# Patient Record
Sex: MALE | Employment: OTHER | URBAN - METROPOLITAN AREA
[De-identification: names, ages, dates, MRNs, and addresses within clinical notes are randomized per-mention and may not be internally consistent; named-entity substitution may affect disease eponyms.]

---

## 2020-03-09 ENCOUNTER — OFFICE VISIT (OUTPATIENT)
Dept: FAMILY MEDICINE CLINIC | Facility: CLINIC | Age: 85
End: 2020-03-09
Payer: MEDICARE

## 2020-03-09 VITALS
BODY MASS INDEX: 23.19 KG/M2 | DIASTOLIC BLOOD PRESSURE: 62 MMHG | SYSTOLIC BLOOD PRESSURE: 138 MMHG | HEIGHT: 70 IN | TEMPERATURE: 98 F | RESPIRATION RATE: 16 BRPM | HEART RATE: 93 BPM | WEIGHT: 162 LBS

## 2020-03-09 DIAGNOSIS — N52.9 ERECTILE DYSFUNCTION, UNSPECIFIED ERECTILE DYSFUNCTION TYPE: ICD-10-CM

## 2020-03-09 DIAGNOSIS — I10 ESSENTIAL HYPERTENSION: ICD-10-CM

## 2020-03-09 DIAGNOSIS — N40.0 BENIGN PROSTATIC HYPERPLASIA WITHOUT LOWER URINARY TRACT SYMPTOMS: ICD-10-CM

## 2020-03-09 DIAGNOSIS — H40.9 GLAUCOMA OF BOTH EYES, UNSPECIFIED GLAUCOMA TYPE: Primary | ICD-10-CM

## 2020-03-09 DIAGNOSIS — Z76.89 ESTABLISHING CARE WITH NEW DOCTOR, ENCOUNTER FOR: ICD-10-CM

## 2020-03-09 DIAGNOSIS — E78.5 HYPERLIPIDEMIA, UNSPECIFIED HYPERLIPIDEMIA TYPE: ICD-10-CM

## 2020-03-09 PROCEDURE — 99204 OFFICE O/P NEW MOD 45 MIN: CPT | Performed by: FAMILY MEDICINE

## 2020-03-09 RX ORDER — LATANOPROST 50 UG/ML
SOLUTION/ DROPS OPHTHALMIC NIGHTLY
COMMUNITY
End: 2020-03-09

## 2020-03-09 RX ORDER — DUTASTERIDE 0.5 MG/1
0.5 CAPSULE, LIQUID FILLED ORAL DAILY
Qty: 90 CAPSULE | Refills: 0 | Status: SHIPPED | OUTPATIENT
Start: 2020-03-09 | End: 2021-02-04

## 2020-03-09 RX ORDER — PRAVASTATIN SODIUM 40 MG
40 TABLET ORAL NIGHTLY
COMMUNITY
End: 2020-03-09

## 2020-03-09 RX ORDER — TIZANIDINE 2 MG/1
2 TABLET ORAL 3 TIMES DAILY
COMMUNITY

## 2020-03-09 RX ORDER — PRAVASTATIN SODIUM 40 MG
40 TABLET ORAL NIGHTLY
Qty: 90 TABLET | Refills: 0 | Status: SHIPPED | OUTPATIENT
Start: 2020-03-09

## 2020-03-09 RX ORDER — SILDENAFIL 100 MG/1
100 TABLET, FILM COATED ORAL
COMMUNITY

## 2020-03-09 RX ORDER — DUTASTERIDE 0.5 MG/1
0.5 CAPSULE, LIQUID FILLED ORAL DAILY
COMMUNITY
End: 2020-03-09

## 2020-03-09 RX ORDER — AMLODIPINE BESYLATE AND BENAZEPRIL HYDROCHLORIDE 10; 20 MG/1; MG/1
1 CAPSULE ORAL DAILY
COMMUNITY
End: 2020-03-09

## 2020-03-09 RX ORDER — LATANOPROST 50 UG/ML
1 SOLUTION/ DROPS OPHTHALMIC NIGHTLY
Qty: 7.5 ML | Refills: 0 | Status: SHIPPED | OUTPATIENT
Start: 2020-03-09 | End: 2020-06-03

## 2020-03-09 RX ORDER — AMLODIPINE BESYLATE AND BENAZEPRIL HYDROCHLORIDE 10; 20 MG/1; MG/1
1 CAPSULE ORAL DAILY
Qty: 90 CAPSULE | Refills: 0 | Status: SHIPPED | OUTPATIENT
Start: 2020-03-09 | End: 2021-08-25

## 2020-03-09 NOTE — PROGRESS NOTES
HPI:    Patient ID: Nolberto Flores is a 82year old male. This is a 51-year-old -American male here today to establish care with a new physician. Patient is treated for chronic hypertension as well as hyperlipidemia.   He does have reoccurring low distress. Neurological: He is alert and oriented to person, place, and time. No cranial nerve deficit. ASSESSMENT/PLAN:   1.  Establishing care with new doctor, encounter for  Care has been established and the patient will come back for preve enabled. Return in about 3 months (around 6/9/2020), or if symptoms worsen or fail to improve.            #[id number]

## 2020-03-09 NOTE — PATIENT INSTRUCTIONS
Patient referred to ophthalmology regarding glaucoma and any other pathology of the eye. Medications have been reviewed and renewed. Compliance with taking medication is encouraged.   Patient has been advised that he can come back to have a complete physi

## 2020-03-17 PROBLEM — Z76.89 ESTABLISHING CARE WITH NEW DOCTOR, ENCOUNTER FOR: Status: ACTIVE | Noted: 2020-03-17

## 2020-06-01 ENCOUNTER — TELEPHONE (OUTPATIENT)
Dept: FAMILY MEDICINE CLINIC | Facility: CLINIC | Age: 85
End: 2020-06-01

## 2020-06-01 NOTE — TELEPHONE ENCOUNTER
pt. has resched his 6/9 appt. to 6/18/20, pt. insisted on in-office appt. , instead of Virtual appt., Is it ok with Dr Fernando Salvador for in-office, as the pt is 82years old? Virtual appt. , was offered, but the pt declined.

## 2020-06-03 DIAGNOSIS — H40.9 GLAUCOMA OF BOTH EYES, UNSPECIFIED GLAUCOMA TYPE: ICD-10-CM

## 2020-06-03 RX ORDER — LATANOPROST 50 UG/ML
SOLUTION/ DROPS OPHTHALMIC
Qty: 9 ML | Refills: 0 | Status: SHIPPED | OUTPATIENT
Start: 2020-06-03 | End: 2020-06-06

## 2020-06-06 DIAGNOSIS — H40.9 GLAUCOMA OF BOTH EYES, UNSPECIFIED GLAUCOMA TYPE: ICD-10-CM

## 2020-06-06 RX ORDER — LATANOPROST 50 UG/ML
SOLUTION/ DROPS OPHTHALMIC
Qty: 9 ML | Refills: 0 | Status: SHIPPED | OUTPATIENT
Start: 2020-06-06 | End: 2020-09-18

## 2020-06-18 ENCOUNTER — OFFICE VISIT (OUTPATIENT)
Dept: FAMILY MEDICINE CLINIC | Facility: CLINIC | Age: 85
End: 2020-06-18
Payer: MEDICARE

## 2020-06-18 VITALS
TEMPERATURE: 98 F | RESPIRATION RATE: 18 BRPM | WEIGHT: 162 LBS | DIASTOLIC BLOOD PRESSURE: 64 MMHG | HEIGHT: 70 IN | BODY MASS INDEX: 23.19 KG/M2 | SYSTOLIC BLOOD PRESSURE: 120 MMHG | HEART RATE: 105 BPM

## 2020-06-18 DIAGNOSIS — H40.9 GLAUCOMA OF BOTH EYES, UNSPECIFIED GLAUCOMA TYPE: ICD-10-CM

## 2020-06-18 DIAGNOSIS — E78.5 HYPERLIPIDEMIA, UNSPECIFIED HYPERLIPIDEMIA TYPE: ICD-10-CM

## 2020-06-18 DIAGNOSIS — I10 ESSENTIAL HYPERTENSION: Primary | ICD-10-CM

## 2020-06-18 PROCEDURE — 99214 OFFICE O/P EST MOD 30 MIN: CPT | Performed by: FAMILY MEDICINE

## 2020-06-18 RX ORDER — IBUPROFEN 800 MG/1
800 TABLET ORAL EVERY 8 HOURS PRN
COMMUNITY
Start: 2020-04-01 | End: 2020-09-21

## 2020-06-18 NOTE — PROGRESS NOTES
HPI:    Patient ID: Beth Singleton is a 82year old male. This patient is 53-year-old -American male newly established at our clinic who was treated for hypertension, hyperlipidemia and also treated by ophthalmologist for his glaucoma.   The paticorie alert and oriented to person, place, and time. No cranial nerve deficit, sensory deficit or motor deficit. ASSESSMENT/PLAN:   1. Essential hypertension  Blood pressure measures to goal by criteria when measured manually by physician.     2. Hyp

## 2020-06-18 NOTE — PATIENT INSTRUCTIONS
Medication reviewed and renewed where needed and appropriate. Comply with medications. Monitor blood pressures and record at home. Limit salt intake. Encouraged physical fitness and daily physical activity daily.   Keep appointment with ophthalmologist r

## 2020-06-25 ENCOUNTER — TELEPHONE (OUTPATIENT)
Dept: FAMILY MEDICINE CLINIC | Facility: CLINIC | Age: 85
End: 2020-06-25

## 2020-06-25 NOTE — TELEPHONE ENCOUNTER
Patient requesting for referral to Dr Juvenal Garcia to be faxed to his office at [phone].  Has appointment today at 11am

## 2020-07-08 ENCOUNTER — TELEPHONE (OUTPATIENT)
Dept: CASE MANAGEMENT | Age: 85
End: 2020-07-08

## 2020-07-08 NOTE — TELEPHONE ENCOUNTER
Sudeep Miner,    I am working on the referral you submitted for [facility] to see Dr. Dave Wu, ophthalmologist.    I am unable to submit this referral because you are not the PCP listed on the patient insurance card. Patient will have to call his insurance company if he wishes to change his PCP to you. Please advise patient referral is void.     Thank you  Germán Velarde

## 2020-09-18 DIAGNOSIS — H40.9 GLAUCOMA OF BOTH EYES, UNSPECIFIED GLAUCOMA TYPE: ICD-10-CM

## 2020-09-18 RX ORDER — LATANOPROST 50 UG/ML
SOLUTION/ DROPS OPHTHALMIC
Qty: 9 ML | Refills: 0 | Status: SHIPPED | OUTPATIENT
Start: 2020-09-18

## 2020-09-21 RX ORDER — IBUPROFEN 800 MG/1
800 TABLET ORAL EVERY 8 HOURS PRN
Qty: 90 TABLET | Refills: 0 | Status: SHIPPED | OUTPATIENT
Start: 2020-09-21 | End: 2021-09-20

## 2020-10-07 ENCOUNTER — TELEPHONE (OUTPATIENT)
Dept: FAMILY MEDICINE CLINIC | Facility: CLINIC | Age: 85
End: 2020-10-07

## 2020-10-07 DIAGNOSIS — Z01.00 EYE EXAM, ROUTINE: Primary | ICD-10-CM

## 2020-10-07 NOTE — TELEPHONE ENCOUNTER
Patient needs a referral for Dr. Louise Puente Ophthalmology for yearly eye exam. Please call patient when referral is ready

## 2020-10-07 NOTE — TELEPHONE ENCOUNTER
Dr. Zimmerman Began, patient is requesting a referral to Dr. Erminio Closs. Referral has been pended, please advise.

## 2020-10-08 NOTE — TELEPHONE ENCOUNTER
Spoke with pt to advise of referral. Pt states he has a appointment with Dr. Kristian Mora at [place] [address]. Another referral has been pended for approval.

## 2021-02-04 ENCOUNTER — NURSE TRIAGE (OUTPATIENT)
Dept: FAMILY MEDICINE CLINIC | Facility: CLINIC | Age: 86
End: 2021-02-04

## 2021-02-04 DIAGNOSIS — N40.0 BENIGN PROSTATIC HYPERPLASIA WITHOUT LOWER URINARY TRACT SYMPTOMS: ICD-10-CM

## 2021-02-04 RX ORDER — DUTASTERIDE 0.5 MG/1
0.5 CAPSULE, LIQUID FILLED ORAL DAILY
Qty: 90 CAPSULE | Refills: 0 | Status: SHIPPED | OUTPATIENT
Start: 2021-02-04

## 2021-02-04 NOTE — TELEPHONE ENCOUNTER
Pt states he was taking Avodart previously for increased urination. He is requesting a refill. States it was previously prescribed by Dr Jerome Win. Pt denies hematuria, dysuria, fever, abdominal pain/flank pain. Pt states only has urinary frequency.  Last offi

## 2021-03-02 ENCOUNTER — TELEPHONE (OUTPATIENT)
Dept: FAMILY MEDICINE CLINIC | Facility: CLINIC | Age: 86
End: 2021-03-02

## 2021-03-02 DIAGNOSIS — Z01.00 EYE EXAM, ROUTINE: Primary | ICD-10-CM

## 2021-03-02 NOTE — TELEPHONE ENCOUNTER
Patient is calling and asking to get a referral to see Dr. Andreas Maradiaga in Ophthalmology. Please Advise.

## 2021-03-05 ENCOUNTER — TELEPHONE (OUTPATIENT)
Dept: CASE MANAGEMENT | Age: 86
End: 2021-03-05

## 2021-03-05 NOTE — TELEPHONE ENCOUNTER
Hi Dr. Ramu Montes,    I tried to submit the referral you entered for [facility] to see Dr. Beatriz Ortega and I could not get the authorization because you are not listed as the PCP on [facility] insurance card. I had this issue previously with one of his referrals and called and explained to him that he needs to call his insurance company and change the name of the PCP to you. He did verbalize understanding at the time and said he would call right away, but I checked with [facility] [place] [facility] and he never made the change. I'm not sure if maybe he did not understand what I was explaining to him. Is there someone else who I can maybe reach out to? Thanks  [address]      Spoke with [facility] on 10/21/20 and advised him Dr. Ramu Montes is not listed as his PCP on his insurance plan. Advised [facility] and [facility] the PCP to Dr. Ramu Montes so I am able to obtain approval for his OV with Dr. Oni Meier. Patient verbalized understanding. I retried to submit referral for Dr. Sammi Pete and spoke with Kiya call ref# [id number] to confirm if PCP change has been made and it has not.

## 2021-03-05 NOTE — TELEPHONE ENCOUNTER
Jose Damico,    Dr. Andrea Sousa is in patient's network. I will run it through [facility] [place] [facility] for approval and fax to Dr. Andrea Sousa and let patient know when it is approved.     Thanks  Devi Henderson

## 2021-03-12 DIAGNOSIS — Z23 NEED FOR VACCINATION: ICD-10-CM

## 2021-04-29 ENCOUNTER — OFFICE VISIT (OUTPATIENT)
Dept: FAMILY MEDICINE CLINIC | Facility: CLINIC | Age: 86
End: 2021-04-29
Payer: MEDICARE

## 2021-04-29 ENCOUNTER — LAB ENCOUNTER (OUTPATIENT)
Dept: LAB | Age: 86
End: 2021-04-29
Attending: FAMILY MEDICINE
Payer: MEDICARE

## 2021-04-29 VITALS
SYSTOLIC BLOOD PRESSURE: 110 MMHG | HEIGHT: 70 IN | WEIGHT: 159 LBS | DIASTOLIC BLOOD PRESSURE: 60 MMHG | BODY MASS INDEX: 22.76 KG/M2 | RESPIRATION RATE: 16 BRPM

## 2021-04-29 DIAGNOSIS — Z23 NEED FOR SHINGLES VACCINE: Primary | ICD-10-CM

## 2021-04-29 DIAGNOSIS — E78.5 HYPERLIPIDEMIA, UNSPECIFIED HYPERLIPIDEMIA TYPE: ICD-10-CM

## 2021-04-29 DIAGNOSIS — I10 ESSENTIAL HYPERTENSION: ICD-10-CM

## 2021-04-29 DIAGNOSIS — F41.9 ANXIETY: ICD-10-CM

## 2021-04-29 DIAGNOSIS — Z00.00 MEDICARE ANNUAL WELLNESS VISIT, INITIAL: ICD-10-CM

## 2021-04-29 PROCEDURE — 80053 COMPREHEN METABOLIC PANEL: CPT | Performed by: FAMILY MEDICINE

## 2021-04-29 PROCEDURE — 80061 LIPID PANEL: CPT | Performed by: FAMILY MEDICINE

## 2021-04-29 PROCEDURE — 3074F SYST BP LT 130 MM HG: CPT | Performed by: FAMILY MEDICINE

## 2021-04-29 PROCEDURE — 96160 PT-FOCUSED HLTH RISK ASSMT: CPT | Performed by: FAMILY MEDICINE

## 2021-04-29 PROCEDURE — 99397 PER PM REEVAL EST PAT 65+ YR: CPT | Performed by: FAMILY MEDICINE

## 2021-04-29 PROCEDURE — 84443 ASSAY THYROID STIM HORMONE: CPT | Performed by: FAMILY MEDICINE

## 2021-04-29 PROCEDURE — 36415 COLL VENOUS BLD VENIPUNCTURE: CPT | Performed by: FAMILY MEDICINE

## 2021-04-29 PROCEDURE — 3008F BODY MASS INDEX DOCD: CPT | Performed by: FAMILY MEDICINE

## 2021-04-29 PROCEDURE — 85025 COMPLETE CBC W/AUTO DIFF WBC: CPT | Performed by: FAMILY MEDICINE

## 2021-04-29 PROCEDURE — 3078F DIAST BP <80 MM HG: CPT | Performed by: FAMILY MEDICINE

## 2021-04-29 PROCEDURE — G0439 PPPS, SUBSEQ VISIT: HCPCS | Performed by: FAMILY MEDICINE

## 2021-04-29 RX ORDER — LORAZEPAM 0.5 MG/1
0.5 TABLET ORAL DAILY PRN
Qty: 30 TABLET | Refills: 1 | Status: SHIPPED | OUTPATIENT
Start: 2021-04-29 | End: 2022-02-07

## 2021-04-29 NOTE — PROGRESS NOTES
HPI/Subjective:   Patient ID: Jordi Bateman is a 82year old male.     82year old AA male here for medicare annual physical and for status update on any confirmed chronic medical illnesses and follow up on any previous labs or procedures that were suggesti this frequency, by your insurer. Please check with your insurance carrier before scheduling to verify coverage.     PREVENTATIVE SERVICES  INDICATIONS AND SCHEDULE Internal Lab or Procedure External Lab or Procedure   Diabetes Screening      HbgA1C   Annual found for: CREATSERUM No flowsheet data found. Digoxin Serum Conc  Annually No results found for: DIGOXIN No flowsheet data found. Diabetes      HgbA1C  Annually No results found for: A1C No flowsheet data found.     Creat/alb ratio  Annually      LDL healthcare power of ?: No    Do you have a living will?: No     Hearing Assessment (Required for AWV/SWV)    Questionnaire    Visual Acuity     Right Eye Visual Acuity: Corrected Left Eye Visual Acuity: Corrected           Cognitive Assessment Encounter      CBC W Differential W Platelet [E]      Comp Metabolic Panel (14) [E]      Lipid Panel [E]      TSH W Reflex To Free T4 [E]      Urinalysis, Routine [E]      Meds This Visit:  Requested Prescriptions     Signed Prescriptions Disp Refills   •

## 2021-08-25 DIAGNOSIS — I10 ESSENTIAL HYPERTENSION: ICD-10-CM

## 2021-08-25 RX ORDER — AMLODIPINE BESYLATE AND BENAZEPRIL HYDROCHLORIDE 10; 20 MG/1; MG/1
1 CAPSULE ORAL DAILY
Qty: 90 CAPSULE | Refills: 1 | Status: SHIPPED | OUTPATIENT
Start: 2021-08-25 | End: 2022-05-17

## 2021-08-25 NOTE — TELEPHONE ENCOUNTER
Refill passed per [facility], [facility] protocol. Requested Prescriptions   Pending Prescriptions Disp Refills    amLODIPine Besy-Benazepril HCl 10-20 MG Oral Cap 90 capsule 0     Sig: Take 1 capsule by mouth daily.         Hypertensive Medications Protocol Passed - 8/25/2021 11:51 AM        Passed - CMP or BMP in past 12 months        Passed - Appointment in past 6 or next 3 months        Passed - GFR  > 50     Lab Results   Component Value Date    GFRAA [de-identified] 04/29/2021                         Recent Outpatient Visits              3 months ago Need for shingles vaccine    [facility], Bina Trinh Earlie Stake, DO    Office Visit    1 year ago Essential hypertension    [facility], Bina Trinh Earlie Stake, DO    Office Visit    1 year ago Glaucoma of both eyes, unspecified glaucoma type    [facility], Bina Trinh Earlie Stake, [place]    Office Visit

## 2021-09-10 ENCOUNTER — NURSE TRIAGE (OUTPATIENT)
Dept: FAMILY MEDICINE CLINIC | Facility: CLINIC | Age: 86
End: 2021-09-10

## 2021-09-10 NOTE — TELEPHONE ENCOUNTER
Action Requested: Summary for Provider     []  Critical Lab, Recommendations Needed  [] Need Additional Advice  []   FYI    []   Need Orders  [] Need Medications Sent to Pharmacy  []  Other     SUMMARY: pt states that he thinks that he has an abdominal h

## 2021-09-15 ENCOUNTER — OFFICE VISIT (OUTPATIENT)
Dept: FAMILY MEDICINE CLINIC | Facility: CLINIC | Age: 86
End: 2021-09-15
Payer: MEDICARE

## 2021-09-15 VITALS
HEART RATE: 82 BPM | DIASTOLIC BLOOD PRESSURE: 62 MMHG | TEMPERATURE: 98 F | HEIGHT: 70 IN | WEIGHT: 153 LBS | BODY MASS INDEX: 21.9 KG/M2 | SYSTOLIC BLOOD PRESSURE: 133 MMHG

## 2021-09-15 DIAGNOSIS — K40.90 DIRECT LEFT INGUINAL HERNIA: Primary | ICD-10-CM

## 2021-09-15 DIAGNOSIS — I10 ESSENTIAL HYPERTENSION: ICD-10-CM

## 2021-09-15 PROCEDURE — 3075F SYST BP GE 130 - 139MM HG: CPT | Performed by: FAMILY MEDICINE

## 2021-09-15 PROCEDURE — 99214 OFFICE O/P EST MOD 30 MIN: CPT | Performed by: FAMILY MEDICINE

## 2021-09-15 PROCEDURE — 3078F DIAST BP <80 MM HG: CPT | Performed by: FAMILY MEDICINE

## 2021-09-15 PROCEDURE — 3008F BODY MASS INDEX DOCD: CPT | Performed by: FAMILY MEDICINE

## 2021-09-15 NOTE — PROGRESS NOTES
Subjective:   Patient ID: Deanna Benz is a 82year old male. This is a very pleasant 51-year-old -American hypertensive gentleman who has noted over the last 2 weeks a discomfort and a bulging on the left side in the groin region.   There is no hernia is present. Hernia is present in the left inguinal area. Comments: Direct inguinal hernia on the left. Neurological:      General: No focal deficit present. Mental Status: He is alert and oriented to person, place, and time.          Asse

## 2021-09-15 NOTE — PATIENT INSTRUCTIONS
Patient is being referred to general surgery for treatment options regarding his left direct inguinal hernia.

## 2021-09-20 RX ORDER — IBUPROFEN 800 MG/1
TABLET ORAL
Qty: 90 TABLET | Refills: 0 | OUTPATIENT
Start: 2021-09-20

## 2021-09-20 RX ORDER — IBUPROFEN 800 MG/1
TABLET ORAL
Qty: 90 TABLET | Refills: 0 | Status: ON HOLD | OUTPATIENT
Start: 2021-09-20 | End: 2021-10-21

## 2021-09-20 NOTE — TELEPHONE ENCOUNTER
Patient had reported not taking. Refill denied with request for patient to call office.     Medication Quantity Refills Start End   ibuprofen 800 MG Oral Tab 90 tablet 0 9/21/2020    Sig:   Take 1 tablet (800 mg total) by mouth every 8 (eight) hours as need

## 2021-09-20 NOTE — TELEPHONE ENCOUNTER
Protocol failed or has No Protocol, please review  Requested Prescriptions   Pending Prescriptions Disp Refills    IBUPROFEN 800 MG Oral Tab [Pharmacy Med Name: Ibuprofen 800 MG Oral Tablet] 90 tablet 0     Sig: TAKE 1 TABLET BY MOUTH EVERY 8 HOURS AS NEED

## 2021-09-21 RX ORDER — IBUPROFEN 800 MG/1
TABLET ORAL
Qty: 90 TABLET | Refills: 0 | OUTPATIENT
Start: 2021-09-21

## 2021-09-21 NOTE — TELEPHONE ENCOUNTER
Patient needs a refill on      Ibuprofen 800 MG TAKE 1 TABLET BY MOUTH EVERY 8 HOURS AS NEEDED    Thank you.

## 2021-10-05 ENCOUNTER — TELEPHONE (OUTPATIENT)
Dept: FAMILY MEDICINE CLINIC | Facility: CLINIC | Age: 86
End: 2021-10-05

## 2021-10-05 NOTE — TELEPHONE ENCOUNTER
Patient called to find out when his eye appointment was. Patient sees Ashley Hinton at [facility]. Gave him their number to call and check his appointment.

## 2021-10-06 ENCOUNTER — OFFICE VISIT (OUTPATIENT)
Dept: SURGERY | Facility: CLINIC | Age: 86
End: 2021-10-06
Payer: MEDICARE

## 2021-10-06 VITALS
DIASTOLIC BLOOD PRESSURE: 65 MMHG | WEIGHT: 151 LBS | BODY MASS INDEX: 21.62 KG/M2 | HEIGHT: 70 IN | HEART RATE: 71 BPM | SYSTOLIC BLOOD PRESSURE: 150 MMHG

## 2021-10-06 DIAGNOSIS — K40.30 INCARCERATED LEFT INGUINAL HERNIA: Primary | ICD-10-CM

## 2021-10-06 PROCEDURE — 3078F DIAST BP <80 MM HG: CPT | Performed by: SURGERY

## 2021-10-06 PROCEDURE — 3008F BODY MASS INDEX DOCD: CPT | Performed by: SURGERY

## 2021-10-06 PROCEDURE — 3077F SYST BP >= 140 MM HG: CPT | Performed by: SURGERY

## 2021-10-06 PROCEDURE — 99214 OFFICE O/P EST MOD 30 MIN: CPT | Performed by: SURGERY

## 2021-10-06 NOTE — PATIENT INSTRUCTIONS
Obtain preoperative testing.     Stop aspirin for 5 days prior to surgery    Same day surgery will call the day before with instructions

## 2021-10-06 NOTE — H&P
History and Physical      HPI   Patient presents with:  Referral: Referral from Dr. Marcello Rubio  Left Inguinal Hernia. Patient reports onset about three months. Patient reports pain is about L6. Patient reports increased pain with activity. History    Socioeconomic History      Marital status:      Tobacco Use      Smoking status: Never Smoker      Smokeless tobacco: Never Used    Vaping Use      Vaping Use: Never used    Substance and Sexual Activity      Alcohol use: Yes        Commen

## 2021-10-06 NOTE — H&P (VIEW-ONLY)
History and Physical      HPI   Patient presents with:  Referral: Referral from Dr. Kandy Trimble  Left Inguinal Hernia. Patient reports onset about three months. Patient reports pain is about L6. Patient reports increased pain with activity. History    Socioeconomic History      Marital status:      Tobacco Use      Smoking status: Never Smoker      Smokeless tobacco: Never Used    Vaping Use      Vaping Use: Never used    Substance and Sexual Activity      Alcohol use: Yes        Commen

## 2021-10-15 ENCOUNTER — TELEPHONE (OUTPATIENT)
Dept: SURGERY | Facility: CLINIC | Age: 86
End: 2021-10-15

## 2021-10-18 ENCOUNTER — LAB ENCOUNTER (OUTPATIENT)
Dept: LAB | Age: 86
End: 2021-10-18
Attending: SURGERY
Payer: MEDICARE

## 2021-10-18 ENCOUNTER — EKG ENCOUNTER (OUTPATIENT)
Dept: LAB | Age: 86
End: 2021-10-18
Attending: SURGERY
Payer: MEDICARE

## 2021-10-18 DIAGNOSIS — K40.30 INCARCERATED LEFT INGUINAL HERNIA: ICD-10-CM

## 2021-10-18 DIAGNOSIS — Z01.818 PREOP TESTING: ICD-10-CM

## 2021-10-18 PROCEDURE — 93010 ELECTROCARDIOGRAM REPORT: CPT | Performed by: SURGERY

## 2021-10-18 PROCEDURE — 80053 COMPREHEN METABOLIC PANEL: CPT | Performed by: SURGERY

## 2021-10-18 PROCEDURE — 85025 COMPLETE CBC W/AUTO DIFF WBC: CPT | Performed by: SURGERY

## 2021-10-18 PROCEDURE — 36415 COLL VENOUS BLD VENIPUNCTURE: CPT | Performed by: SURGERY

## 2021-10-18 PROCEDURE — 85610 PROTHROMBIN TIME: CPT | Performed by: SURGERY

## 2021-10-18 PROCEDURE — 93005 ELECTROCARDIOGRAM TRACING: CPT

## 2021-10-19 NOTE — TELEPHONE ENCOUNTER
Patient states he is scheduled 10/21/21 for a procedure and needs a time so he can find a ride.  Please advise

## 2021-10-21 ENCOUNTER — ANESTHESIA EVENT (OUTPATIENT)
Dept: SURGERY | Facility: HOSPITAL | Age: 86
End: 2021-10-21
Payer: MEDICARE

## 2021-10-21 ENCOUNTER — HOSPITAL ENCOUNTER (OUTPATIENT)
Facility: HOSPITAL | Age: 86
Setting detail: HOSPITAL OUTPATIENT SURGERY
Discharge: HOME | End: 2021-10-21
Attending: SURGERY | Admitting: SURGERY
Payer: MEDICARE

## 2021-10-21 ENCOUNTER — ANESTHESIA (OUTPATIENT)
Dept: SURGERY | Facility: HOSPITAL | Age: 86
End: 2021-10-21
Payer: MEDICARE

## 2021-10-21 VITALS
SYSTOLIC BLOOD PRESSURE: 135 MMHG | HEART RATE: 87 BPM | OXYGEN SATURATION: 99 % | BODY MASS INDEX: 22.05 KG/M2 | HEIGHT: 70 IN | TEMPERATURE: 98 F | DIASTOLIC BLOOD PRESSURE: 58 MMHG | WEIGHT: 154 LBS | RESPIRATION RATE: 16 BRPM

## 2021-10-21 DIAGNOSIS — K40.30 INCARCERATED LEFT INGUINAL HERNIA: ICD-10-CM

## 2021-10-21 DIAGNOSIS — Z01.818 PREOP TESTING: Primary | ICD-10-CM

## 2021-10-21 PROCEDURE — 49507 PRP I/HERN INIT BLOCK >5 YR: CPT | Performed by: SURGERY

## 2021-10-21 PROCEDURE — 0YU60JZ SUPPLEMENT LEFT INGUINAL REGION WITH SYNTHETIC SUBSTITUTE, OPEN APPROACH: ICD-10-PCS | Performed by: SURGERY

## 2021-10-21 DEVICE — BARD MESH PERFIX PLUG, EXTRA LARGE
Type: IMPLANTABLE DEVICE | Site: INGUINAL | Status: FUNCTIONAL
Brand: BARD MESH PERFIX PLUG

## 2021-10-21 RX ORDER — MORPHINE SULFATE 10 MG/ML
6 INJECTION, SOLUTION INTRAMUSCULAR; INTRAVENOUS EVERY 10 MIN PRN
Status: DISCONTINUED | OUTPATIENT
Start: 2021-10-21 | End: 2021-10-21

## 2021-10-21 RX ORDER — HALOPERIDOL 5 MG/ML
0.25 INJECTION INTRAMUSCULAR ONCE AS NEEDED
Status: DISCONTINUED | OUTPATIENT
Start: 2021-10-21 | End: 2021-10-21

## 2021-10-21 RX ORDER — HYDROMORPHONE HYDROCHLORIDE 1 MG/ML
0.6 INJECTION, SOLUTION INTRAMUSCULAR; INTRAVENOUS; SUBCUTANEOUS EVERY 5 MIN PRN
Status: DISCONTINUED | OUTPATIENT
Start: 2021-10-21 | End: 2021-10-21

## 2021-10-21 RX ORDER — IBUPROFEN 800 MG/1
800 TABLET ORAL EVERY 8 HOURS PRN
Qty: 90 TABLET | Refills: 0 | Status: SHIPPED | OUTPATIENT
Start: 2021-10-21

## 2021-10-21 RX ORDER — DEXAMETHASONE SODIUM PHOSPHATE 4 MG/ML
VIAL (ML) INJECTION AS NEEDED
Status: DISCONTINUED | OUTPATIENT
Start: 2021-10-21 | End: 2021-10-21 | Stop reason: SURG

## 2021-10-21 RX ORDER — ACETAMINOPHEN 500 MG
1000 TABLET ORAL ONCE
Status: COMPLETED | OUTPATIENT
Start: 2021-10-21 | End: 2021-10-21

## 2021-10-21 RX ORDER — HYDROCODONE BITARTRATE AND ACETAMINOPHEN 5; 325 MG/1; MG/1
1 TABLET ORAL AS NEEDED
Status: DISCONTINUED | OUTPATIENT
Start: 2021-10-21 | End: 2021-10-21

## 2021-10-21 RX ORDER — NEOSTIGMINE METHYLSULFATE 1 MG/ML
INJECTION INTRAVENOUS AS NEEDED
Status: DISCONTINUED | OUTPATIENT
Start: 2021-10-21 | End: 2021-10-21 | Stop reason: SURG

## 2021-10-21 RX ORDER — MORPHINE SULFATE 4 MG/ML
2 INJECTION, SOLUTION INTRAMUSCULAR; INTRAVENOUS EVERY 10 MIN PRN
Status: DISCONTINUED | OUTPATIENT
Start: 2021-10-21 | End: 2021-10-21

## 2021-10-21 RX ORDER — ONDANSETRON 2 MG/ML
INJECTION INTRAMUSCULAR; INTRAVENOUS AS NEEDED
Status: DISCONTINUED | OUTPATIENT
Start: 2021-10-21 | End: 2021-10-21 | Stop reason: SURG

## 2021-10-21 RX ORDER — SODIUM CHLORIDE, SODIUM LACTATE, POTASSIUM CHLORIDE, CALCIUM CHLORIDE 600; 310; 30; 20 MG/100ML; MG/100ML; MG/100ML; MG/100ML
INJECTION, SOLUTION INTRAVENOUS CONTINUOUS
Status: DISCONTINUED | OUTPATIENT
Start: 2021-10-21 | End: 2021-10-21

## 2021-10-21 RX ORDER — GLYCOPYRROLATE 0.2 MG/ML
INJECTION, SOLUTION INTRAMUSCULAR; INTRAVENOUS AS NEEDED
Status: DISCONTINUED | OUTPATIENT
Start: 2021-10-21 | End: 2021-10-21 | Stop reason: SURG

## 2021-10-21 RX ORDER — BUPIVACAINE HYDROCHLORIDE AND EPINEPHRINE 2.5; 5 MG/ML; UG/ML
INJECTION, SOLUTION INFILTRATION; PERINEURAL AS NEEDED
Status: DISCONTINUED | OUTPATIENT
Start: 2021-10-21 | End: 2021-10-21 | Stop reason: HOSPADM

## 2021-10-21 RX ORDER — HYDROMORPHONE HYDROCHLORIDE 1 MG/ML
0.2 INJECTION, SOLUTION INTRAMUSCULAR; INTRAVENOUS; SUBCUTANEOUS EVERY 5 MIN PRN
Status: DISCONTINUED | OUTPATIENT
Start: 2021-10-21 | End: 2021-10-21

## 2021-10-21 RX ORDER — CEFAZOLIN SODIUM/WATER 2 G/20 ML
2 SYRINGE (ML) INTRAVENOUS ONCE
Status: COMPLETED | OUTPATIENT
Start: 2021-10-21 | End: 2021-10-21

## 2021-10-21 RX ORDER — PROCHLORPERAZINE EDISYLATE 5 MG/ML
5 INJECTION INTRAMUSCULAR; INTRAVENOUS ONCE AS NEEDED
Status: DISCONTINUED | OUTPATIENT
Start: 2021-10-21 | End: 2021-10-21

## 2021-10-21 RX ORDER — NALOXONE HYDROCHLORIDE 0.4 MG/ML
80 INJECTION, SOLUTION INTRAMUSCULAR; INTRAVENOUS; SUBCUTANEOUS AS NEEDED
Status: DISCONTINUED | OUTPATIENT
Start: 2021-10-21 | End: 2021-10-21

## 2021-10-21 RX ORDER — HYDROMORPHONE HYDROCHLORIDE 1 MG/ML
0.4 INJECTION, SOLUTION INTRAMUSCULAR; INTRAVENOUS; SUBCUTANEOUS EVERY 5 MIN PRN
Status: DISCONTINUED | OUTPATIENT
Start: 2021-10-21 | End: 2021-10-21

## 2021-10-21 RX ORDER — HYDROCODONE BITARTRATE AND ACETAMINOPHEN 5; 325 MG/1; MG/1
2 TABLET ORAL AS NEEDED
Status: DISCONTINUED | OUTPATIENT
Start: 2021-10-21 | End: 2021-10-21

## 2021-10-21 RX ORDER — MORPHINE SULFATE 4 MG/ML
4 INJECTION, SOLUTION INTRAMUSCULAR; INTRAVENOUS EVERY 10 MIN PRN
Status: DISCONTINUED | OUTPATIENT
Start: 2021-10-21 | End: 2021-10-21

## 2021-10-21 RX ORDER — LIDOCAINE HYDROCHLORIDE 10 MG/ML
INJECTION, SOLUTION EPIDURAL; INFILTRATION; INTRACAUDAL; PERINEURAL AS NEEDED
Status: DISCONTINUED | OUTPATIENT
Start: 2021-10-21 | End: 2021-10-21 | Stop reason: SURG

## 2021-10-21 RX ORDER — ROCURONIUM BROMIDE 10 MG/ML
INJECTION, SOLUTION INTRAVENOUS AS NEEDED
Status: DISCONTINUED | OUTPATIENT
Start: 2021-10-21 | End: 2021-10-21 | Stop reason: SURG

## 2021-10-21 RX ORDER — ONDANSETRON 2 MG/ML
4 INJECTION INTRAMUSCULAR; INTRAVENOUS ONCE AS NEEDED
Status: DISCONTINUED | OUTPATIENT
Start: 2021-10-21 | End: 2021-10-21

## 2021-10-21 RX ADMIN — NEOSTIGMINE METHYLSULFATE 4 MG: 1 INJECTION INTRAVENOUS at 09:58:00

## 2021-10-21 RX ADMIN — GLYCOPYRROLATE 0.6 MG: 0.2 INJECTION, SOLUTION INTRAMUSCULAR; INTRAVENOUS at 09:58:00

## 2021-10-21 RX ADMIN — DEXAMETHASONE SODIUM PHOSPHATE 4 MG: 4 MG/ML VIAL (ML) INJECTION at 09:35:00

## 2021-10-21 RX ADMIN — ONDANSETRON 4 MG: 2 INJECTION INTRAMUSCULAR; INTRAVENOUS at 09:49:00

## 2021-10-21 RX ADMIN — ROCURONIUM BROMIDE 35 MG: 10 INJECTION, SOLUTION INTRAVENOUS at 09:30:00

## 2021-10-21 RX ADMIN — CEFAZOLIN SODIUM/WATER 2 G: 2 G/20 ML SYRINGE (ML) INTRAVENOUS at 09:35:00

## 2021-10-21 RX ADMIN — LIDOCAINE HYDROCHLORIDE 50 MG: 10 INJECTION, SOLUTION EPIDURAL; INFILTRATION; INTRACAUDAL; PERINEURAL at 09:30:00

## 2021-10-21 NOTE — INTERVAL H&P NOTE
Pre-op Diagnosis: Incarcerated left inguinal hernia [K40.30]    The above referenced H&P was reviewed by Tevin Ruiz MD on 10/21/2021, the patient was examined and no significant changes have occurred in the patient's condition since the H&P was performe

## 2021-10-21 NOTE — ANESTHESIA PROCEDURE NOTES
Airway  Date/Time: 10/21/2021 9:32 AM  Urgency: Elective    Airway not difficult    General Information and Staff    Patient location during procedure: OR  Resident/CRNA: David Issa CRNA  Performed: CRNA     Indications and Patient Condition  Indicati

## 2021-10-21 NOTE — ANESTHESIA PREPROCEDURE EVALUATION
Anesthesia PreOp Note    HPI:     Merly Bolanos is a 82year old male who presents for preoperative consultation requested by: Gonzalez Hewitt MD    Date of Surgery: 10/21/2021    Procedure(s):  left Inguinal Incarcerated hernia repair with mesh  Indication Take 1 tablet (40 mg total) by mouth nightly., Disp: 90 tablet, Rfl: 0, 10/19/2021 at Unknown time  aspirin 325 MG Oral Tab EC, Take 1 tablet (325 mg total) by mouth daily. , Disp: 100 tablet, Rfl: 3, 10/18/2021      lactated ringers infusion, , Intravenous Holiness Services: Not on file      Active Member of Clubs or Organizations: Not on file      Attends Club or Organization Meetings: Not on file      Marital Status: Not on file  Intimate Partner Violence:       Fear of Current or Ex-Partner: Not on file comment: Hyperlipidemia    Neuro/Psych - negative ROS     GI/Hepatic/Renal - negative ROS     Endo/Other - negative ROS   Abdominal  - normal exam               Anesthesia Plan:   ASA:  2  Plan:   General  Airway:  ETT and LMA  Post-op Pain Management: IV

## 2021-10-21 NOTE — OPERATIVE REPORT
[facility]    PATIENT'S NAME: Augustine Arroyo   ATTENDING PHYSICIAN: Albino Knox MD   OPERATING PHYSICIAN: Albino Knox MD   PATIENT ACCOUNT#:   [de-identified]    LOCATION:  [facility] [address] PACU [address] [place] [address]  MEDICAL RECORD #:   [id number]       DATE OF TAMIR

## 2021-10-21 NOTE — ANESTHESIA POSTPROCEDURE EVALUATION
Patient: Deanna Benz    Procedure Summary     Date: 10/21/21 Room / Location: [address] [place] [address] MAIN OR 02 / [facility] MAIN OR    Anesthesia Start: 4330 Anesthesia Stop: 7871    Procedure: left Inguinal Incarcerated hernia repair with mesh (Left Groin) Diagnosis:       [place]

## 2021-10-25 ENCOUNTER — TELEPHONE (OUTPATIENT)
Dept: SURGERY | Facility: CLINIC | Age: 86
End: 2021-10-25

## 2021-10-25 NOTE — TELEPHONE ENCOUNTER
Spoke with patient, confirming his appointment and advising him on his dressing changes. Patient voiced understanding of dressing change.

## 2021-10-25 NOTE — TELEPHONE ENCOUNTER
Patient requesting a call back. Has some post op questions before his appointment scheduled for 10/27/21.  Please advise

## 2021-10-27 ENCOUNTER — OFFICE VISIT (OUTPATIENT)
Dept: SURGERY | Facility: CLINIC | Age: 86
End: 2021-10-27
Payer: MEDICARE

## 2021-10-27 VITALS — HEIGHT: 70 IN | BODY MASS INDEX: 22.05 KG/M2 | WEIGHT: 154 LBS

## 2021-10-27 DIAGNOSIS — Z98.890 POST-OPERATIVE STATE: Primary | ICD-10-CM

## 2021-10-27 PROCEDURE — 3008F BODY MASS INDEX DOCD: CPT | Performed by: SURGERY

## 2021-10-27 PROCEDURE — 99024 POSTOP FOLLOW-UP VISIT: CPT | Performed by: SURGERY

## 2021-10-27 NOTE — PROGRESS NOTES
Postoperative Patient Follow-up      10/27/2021    SINCERE Bateman is a 82year old male post repair of incarcerated left inguinal hernia with mesh. He is 6 days postop and doing well. Exam  Abdomen is soft nontender nondistended.   Incision cl

## 2021-11-08 ENCOUNTER — TELEPHONE (OUTPATIENT)
Dept: FAMILY MEDICINE CLINIC | Facility: CLINIC | Age: 86
End: 2021-11-08

## 2021-11-08 DIAGNOSIS — Z98.890 S/P INGUINAL HERNIA REPAIR: ICD-10-CM

## 2021-11-08 DIAGNOSIS — Z09 FOLLOW UP: ICD-10-CM

## 2021-11-08 DIAGNOSIS — K40.90 DIRECT LEFT INGUINAL HERNIA: Primary | ICD-10-CM

## 2021-11-08 DIAGNOSIS — Z87.19 S/P INGUINAL HERNIA REPAIR: ICD-10-CM

## 2021-11-08 NOTE — TELEPHONE ENCOUNTER
Patient is scheduled for a post op visit with Dr. Charlie Ruelas on 11/16.      [facility]

## 2021-11-16 ENCOUNTER — OFFICE VISIT (OUTPATIENT)
Dept: SURGERY | Facility: CLINIC | Age: 86
End: 2021-11-16
Payer: MEDICARE

## 2021-11-16 VITALS — BODY MASS INDEX: 22.05 KG/M2 | WEIGHT: 154 LBS | HEIGHT: 70 IN

## 2021-11-16 DIAGNOSIS — Z98.890 POST-OPERATIVE STATE: Primary | ICD-10-CM

## 2021-11-16 PROCEDURE — 99024 POSTOP FOLLOW-UP VISIT: CPT | Performed by: SURGERY

## 2021-11-16 PROCEDURE — 3008F BODY MASS INDEX DOCD: CPT | Performed by: SURGERY

## 2021-11-16 NOTE — PROGRESS NOTES
Postoperative Patient Follow-up      11/16/2021    SINCERE      Jeremias Santamaria is a 82year old male post left inguinal hernia repair with mesh.   He is here for second postop visit      Exam  Swelling almost completely resolved incision clean dry intact      As

## 2021-11-29 ENCOUNTER — TELEPHONE (OUTPATIENT)
Dept: SURGERY | Facility: CLINIC | Age: 86
End: 2021-11-29

## 2021-11-29 NOTE — TELEPHONE ENCOUNTER
Patient states he received a call to get his COVID booster. Advised him this was not from our office, but most likely Dr. Nunoial Day office and he should call to schedule the booster. Patient agreed.

## 2021-11-29 NOTE — TELEPHONE ENCOUNTER
Per pt received a call last week requesting that he schedule something regarding covid, does not know what he is supposed to schedule.  Please advise

## 2021-12-06 RX ORDER — IBUPROFEN 800 MG/1
800 TABLET ORAL EVERY 8 HOURS PRN
Qty: 90 TABLET | Refills: 0 | OUTPATIENT
Start: 2021-12-06

## 2022-02-07 RX ORDER — LORAZEPAM 0.5 MG/1
0.5 TABLET ORAL NIGHTLY
Qty: 30 TABLET | Refills: 0 | Status: SHIPPED | OUTPATIENT
Start: 2022-02-07

## 2022-02-07 NOTE — TELEPHONE ENCOUNTER
Please review. No protocol.   Requested Prescriptions   Pending Prescriptions Disp Refills    LORAZEPAM 0.5 MG Oral Tab [Pharmacy Med Name: LORazepam 0.5 MG Oral Tablet] 30 tablet 0     Sig: TAKE 1 TABLET BY MOUTH ONCE DAILY AS NEEDED FOR ANXIETY FOR  ANXIOUS  MOMENTS        There is no refill protocol information for this order           Recent Outpatient Visits              2 months ago Post-operative state    [facility] Mckay Dhillon MD    Office Visit    3 months ago Post-operative [facility]    [facility] Mckay Dhillon MD    Office Visit    4 months ago Incarcerated left inguinal hernia    [facility], [facility], [place] [address], [facility] Mckay Dhillon MD    Office Visit    4 months ago Direct left inguinal hernia    [facility], [facility], [place] [address], [facility] Jennifer Daley DO    Office Visit    9 months ago Need for shingles vaccine    [facility], Jennifer Daley [place]    Office Visit

## 2022-03-29 RX ORDER — LATANOPROST 50 UG/ML
SOLUTION/ DROPS OPHTHALMIC
Qty: 9 ML | Refills: 0 | Status: SHIPPED | OUTPATIENT
Start: 2022-03-29

## 2022-03-31 ENCOUNTER — MED REC SCAN ONLY (OUTPATIENT)
Dept: FAMILY MEDICINE CLINIC | Facility: CLINIC | Age: 87
End: 2022-03-31

## 2022-04-30 ENCOUNTER — NURSE TRIAGE (OUTPATIENT)
Dept: FAMILY MEDICINE CLINIC | Facility: CLINIC | Age: 87
End: 2022-04-30

## 2022-05-02 NOTE — TELEPHONE ENCOUNTER
Called patient to obtain condition update regarding complaint of headaches below, confirmed name and . Complains of 2-3 weeks of intermittent headaches occurring approximately once daily. Unable to rate the pain. Does not have a headache currently. States it sometimes causes difficulty sleeping. Reviewed neurological changes and denies. Denies seasonal/environmental allergies. States prescribed ibuprofen 800mg does not help much. Instructed to add Tylenol and to increase fluid intake and to call back if headache worsens or new symptom develop. Patient verbalized understanding and agrees.     Assisted to schedule an appointment:    Future Appointments   Date Time Provider Shauna Woodard   2022  4:00 PM DO JERRY Levin

## 2022-05-02 NOTE — TELEPHONE ENCOUNTER
Noted. Sounds like nasal sinus congestion. Utilizing a [place] pot might be of some help.

## 2022-05-16 DIAGNOSIS — I10 ESSENTIAL HYPERTENSION: ICD-10-CM

## 2022-05-16 DIAGNOSIS — H40.9 GLAUCOMA OF BOTH EYES, UNSPECIFIED GLAUCOMA TYPE: ICD-10-CM

## 2022-05-17 ENCOUNTER — TELEPHONE (OUTPATIENT)
Dept: INTERNAL MEDICINE CLINIC | Facility: CLINIC | Age: 87
End: 2022-05-17

## 2022-05-17 RX ORDER — LORAZEPAM 0.5 MG/1
TABLET ORAL
Qty: 30 TABLET | Refills: 0 | Status: SHIPPED | OUTPATIENT
Start: 2022-05-17

## 2022-05-17 RX ORDER — AMLODIPINE BESYLATE AND BENAZEPRIL HYDROCHLORIDE 10; 20 MG/1; MG/1
CAPSULE ORAL
Qty: 90 CAPSULE | Refills: 0 | Status: SHIPPED | OUTPATIENT
Start: 2022-05-17

## 2022-05-17 RX ORDER — LATANOPROST 50 UG/ML
SOLUTION/ DROPS OPHTHALMIC
Qty: 9 ML | Refills: 0 | Status: SHIPPED | OUTPATIENT
Start: 2022-05-17

## 2022-07-13 DIAGNOSIS — H40.9 GLAUCOMA OF BOTH EYES, UNSPECIFIED GLAUCOMA TYPE: ICD-10-CM

## 2022-07-13 RX ORDER — LATANOPROST 50 UG/ML
SOLUTION/ DROPS OPHTHALMIC
Qty: 9 ML | Refills: 0 | Status: SHIPPED | OUTPATIENT
Start: 2022-07-13

## 2022-08-05 RX ORDER — LORAZEPAM 0.5 MG/1
TABLET ORAL
Qty: 30 TABLET | Refills: 0 | Status: SHIPPED | OUTPATIENT
Start: 2022-08-05

## 2022-08-13 RX ORDER — IBUPROFEN 800 MG/1
TABLET ORAL
Qty: 90 TABLET | Refills: 0 | Status: SHIPPED | OUTPATIENT
Start: 2022-08-13

## 2022-08-15 NOTE — TELEPHONE ENCOUNTER
2nd attempt- left message to call back. Report received from Shi SEGOVIA.  Pt to R12 from CT  Medicated per MAR  Pt and family educated on NPO status.

## 2022-09-16 RX ORDER — IBUPROFEN 800 MG/1
800 TABLET ORAL EVERY 8 HOURS PRN
Qty: 90 TABLET | Refills: 0 | OUTPATIENT
Start: 2022-09-16

## 2022-10-20 DIAGNOSIS — I10 ESSENTIAL HYPERTENSION: ICD-10-CM

## 2022-10-20 DIAGNOSIS — H40.9 GLAUCOMA OF BOTH EYES, UNSPECIFIED GLAUCOMA TYPE: ICD-10-CM

## 2022-10-21 NOTE — TELEPHONE ENCOUNTER
Protocol failed or has No Protocol, please review  Requested Prescriptions   Pending Prescriptions Disp Refills    LATANOPROST 0.005 % Ophthalmic Solution [Pharmacy Med Name: Latanoprost 0.005 % Ophthalmic Solution] 9 mL 0     Sig: INSTILL 1 DROP INTO EACH EYE NIGHTLY        There is no refill protocol information for this order        AMLODIPINE BESY-BENAZEPRIL HCL 10-20 MG Oral Cap [Pharmacy Med Name: amLODIPine Besy-Benazepril HCl 10-20 MG Oral Capsule] 90 capsule 0     Sig: Take 1 capsule by mouth once daily        Hypertensive Medications Protocol Failed - 10/20/2022  2:11 PM        Failed - In person appointment in the past 12 or next 3 months       Recent Outpatient Visits              11 months ago Post-operative [facility]    [address], [facility] María Abbott MD    Office Visit    11 months ago Post-operative [facility]    [address], [facility] María Abbott MD    Office Visit    1 year ago Incarcerated left inguinal hernia    [address], [facility] María Abbott MD    Office Visit    1 year ago Direct left inguinal hernia    [address], Roxanne Curran DO    Office Visit    1 year ago [facility]ing[facility]    [facility]     Future Appointments         Provider Department Appt Notes    In 2 months Maikel Doe MD [facility] Op[facility]mology np ee *policy informed, aware referral is needed               Failed - CMP or BMP in past 6 months     No results found for this or any previous visit (from the past 4392 hour(s)).               Failed - In person appointment or virtual visit in the past 6 months       Recent Outpatient Visits              11 months ago [facility]-operative [facility]    [facility] [address] [place] [address] María Abbott MD    Office Visit    11 months ago Post-operative state    [place], [facility] María Abbott MD    Office Visit    1 year ago Incarcerated left inguinal hernia    [facility], [facility] María Abbott MD    Office Visit    1 year ago Direct left inguinal hernia    [address], [address], [place]    Office Visit    1 year ago Need for shingles vaccine    [facility], [place], Roxanne Salcido, DO    Office Visit     Future Appointments         Provider Department Appt Notes    In 2 months Maikel Doe MD [facility] Ophthalmology np ee *policy informed, aware referral is needed               Failed - EGFRCR or GFRNAA > 50     GFR Evaluation              Failed - EGFRCR or GFRAA > 50     GFR Evaluation              Passed - Last BP reading less than 140/90     BP Readings from Last 1 Encounters:  10/21/21 : 135/58                    Future Appointments         Provider Department Appt Notes    In 2 months Maikel Doe MD [facility] Ophthalmology np ee *policy informed, aware referral is needed          Recent Outpatient Visits              11 months ago Post-operative state    [place], [facility] María Abbott MD    Office Visit    11 months ago Post-operative state    [address], [address], [facility] María Abbott MD    Office Visit    1 year ago Incarcerated left inguinal hernia    [address], [address], [facility] María Abbott MD    Office Visit    1 year ago Direct left inguinal hernia    [address], [address], Roxanne Curran, DO    Office Visit    1 year ago Need for shingles vaccine    [facility] [place], Roxanne Salcido [place]    Office Visit

## 2022-10-22 RX ORDER — AMLODIPINE BESYLATE AND BENAZEPRIL HYDROCHLORIDE 10; 20 MG/1; MG/1
1 CAPSULE ORAL DAILY
Qty: 90 CAPSULE | Refills: 1 | Status: SHIPPED | OUTPATIENT
Start: 2022-10-22

## 2022-10-22 RX ORDER — LATANOPROST 50 UG/ML
SOLUTION/ DROPS OPHTHALMIC
Qty: 9 ML | Refills: 0 | Status: SHIPPED | OUTPATIENT
Start: 2022-10-22

## 2023-02-21 DIAGNOSIS — H40.9 GLAUCOMA OF BOTH EYES, UNSPECIFIED GLAUCOMA TYPE: ICD-10-CM

## 2023-02-21 RX ORDER — LATANOPROST 50 UG/ML
SOLUTION/ DROPS OPHTHALMIC
Qty: 9 ML | Refills: 0 | Status: SHIPPED | OUTPATIENT
Start: 2023-02-21

## 2023-02-21 RX ORDER — IBUPROFEN 800 MG/1
TABLET ORAL
Qty: 90 TABLET | Refills: 0 | Status: SHIPPED | OUTPATIENT
Start: 2023-02-21

## 2023-02-21 NOTE — TELEPHONE ENCOUNTER
Protocol failed or has No Protocol, please review  Requested Prescriptions   Pending Prescriptions Disp Refills    IBUPROFEN 800 MG Oral Tab [Pharmacy Med Name: Ibuprofen 800 MG Oral Tablet] 90 tablet 0     Sig: TAKE 1 TABLET BY MOUTH EVERY 8 HOURS AS NEEDED       Non-Narcotic Pain Medication Protocol Failed - 2/21/2023  3:50 PM        Failed - In person appointment or virtual visit in the past 6 mos or appointment in next 3 mos     Recent Outpatient Visits              1 year ago Post-operative state    Huan Alcantara MD    Office Visit    1 year ago Post-operative [facility]    [address], [place] [address], [facility] Manav Smith MD    Office Visit    1 year ago Incarcerated left inguinal hernia    [facility], [place] [address], Kirsty Palomares MD    Office Visit    1 year ago Direct left inguinal hernia    [facility], [place] [address], [facility] Chance Ricketts, DO    Office Visit    1 year ago Need for shingles vaccine    [facility], [place] [address], [facility] Chance Ricketts, DO    Office Visit          Future Appointments         Provider Department Appt Notes    In 3 weeks Audra Grider MD [address], [address], [place] np ee *policy informed, aware referral is needed                 LATANOPROST 0.005 % Ophthalmic Solution [Pharmacy Med Name: Latanoprost 0.005 % Ophthalmic Solution] 9 mL 0     Sig: INSTILL 1 DROP INTO EACH EYE NIGHTLY       There is no refill protocol information for this order        Future Appointments         Provider Department Appt Notes    In 3 weeks Audra Grider MD [address] [place] [address], [place] np ee *policy informed, aware referral is needed          Recent Outpatient Visits              1 year ago Post-operative state    [address], [facility] Kirsty Palomares MD    Office Visit    1 year ago Post-operative state    [address], [place] [address], Dionisio Cardenas MD    Office Visit    1 year ago Incarcerated left inguinal hernia    [facility], [place] [address], Dionisio Cardenas MD    Office Visit    1 year ago Direct left inguinal hernia    [facility], [address], Claudia Haile     Office Visit    1 year ago Need for shingles vaccine    [facility], [place] [address], [place], Claudia Haile, [place]    Office Visit

## 2023-03-14 ENCOUNTER — OFFICE VISIT (OUTPATIENT)
Dept: OPHTHALMOLOGY | Facility: CLINIC | Age: 88
End: 2023-03-14

## 2023-03-14 DIAGNOSIS — Z96.1 PSEUDOPHAKIA OF BOTH EYES: ICD-10-CM

## 2023-03-14 DIAGNOSIS — H40.003 GLAUCOMA SUSPECT OF BOTH EYES: Primary | ICD-10-CM

## 2023-03-14 DIAGNOSIS — H43.391 VITREOUS FLOATERS OF RIGHT EYE: ICD-10-CM

## 2023-03-14 PROCEDURE — 92250 FUNDUS PHOTOGRAPHY W/I&R: CPT | Performed by: OPHTHALMOLOGY

## 2023-03-14 PROCEDURE — 92004 COMPRE OPH EXAM NEW PT 1/>: CPT | Performed by: OPHTHALMOLOGY

## 2023-03-14 PROCEDURE — 1126F AMNT PAIN NOTED NONE PRSNT: CPT | Performed by: OPHTHALMOLOGY

## 2023-03-14 RX ORDER — LATANOPROST 50 UG/ML
1 SOLUTION/ DROPS OPHTHALMIC NIGHTLY
Qty: 3 EACH | Refills: 0 | Status: SHIPPED | OUTPATIENT
Start: 2023-03-14

## 2023-03-14 NOTE — ASSESSMENT & PLAN NOTE
Discussed with patient that he is a glaucoma suspect based on increased cupping of the optic nerves in both eyes. Retinal photos taken today to document optic nerves. Glaucoma diagnostic testing ordered. Patient verbalized understanding.     Continue taking Latanoprost in both eyes every night    Will see patient for next available visual field, OCT and pachy with no MD, then 4 months for a pressure check

## 2023-03-14 NOTE — PATIENT INSTRUCTIONS
Pseudophakia of both eyes  No treatment    Recommend +3.25 over the counter reading glasses    Glaucoma suspect of both eyes  Discussed with patient that he is a glaucoma suspect based on increased cupping of the optic nerves in both eyes. Retinal photos taken today to document optic nerves. Glaucoma diagnostic testing ordered. Patient verbalized understanding. Continue taking Latanoprost in both eyes every night    Will see patient for next available visual field, OCT and pachy with no MD, then 4 months for a pressure check      Vitreous floaters of right eye   There is no evidence of retinal pathology. All signs and symptoms of retinal detachment/tears explained in detail. Patient instructed to call the office if they experience increase in floaters, increase in flashes of light, loss of vision or curtain or veil effect.

## 2023-03-23 ENCOUNTER — NURSE ONLY (OUTPATIENT)
Dept: OPHTHALMOLOGY | Facility: CLINIC | Age: 88
End: 2023-03-23

## 2023-03-23 ENCOUNTER — TELEPHONE (OUTPATIENT)
Dept: OPHTHALMOLOGY | Facility: CLINIC | Age: 88
End: 2023-03-23

## 2023-03-23 DIAGNOSIS — H40.003 GLAUCOMA SUSPECT OF BOTH EYES: ICD-10-CM

## 2023-03-23 PROCEDURE — 92133 CPTRZD OPH DX IMG PST SGM ON: CPT | Performed by: OPHTHALMOLOGY

## 2023-03-23 PROCEDURE — 76514 ECHO EXAM OF EYE THICKNESS: CPT | Performed by: OPHTHALMOLOGY

## 2023-03-23 PROCEDURE — 92083 EXTENDED VISUAL FIELD XM: CPT | Performed by: OPHTHALMOLOGY

## 2023-04-03 RX ORDER — LORAZEPAM 0.5 MG/1
0.5 TABLET ORAL NIGHTLY
Qty: 30 TABLET | Refills: 0 | Status: SHIPPED | OUTPATIENT
Start: 2023-04-03

## 2023-04-03 NOTE — TELEPHONE ENCOUNTER
Please review refill protocol failed/ no protocol  Requested Prescriptions   Pending Prescriptions Disp Refills    LORAZEPAM 0.5 MG Oral Tab [Pharmacy Med Name: LORazepam 0.5 MG Oral Tablet] 30 tablet 0     Sig: Take 1 tablet by mouth nightly       There is no refill protocol information for this order

## 2023-06-14 DIAGNOSIS — I10 ESSENTIAL HYPERTENSION: ICD-10-CM

## 2023-06-15 RX ORDER — IBUPROFEN 800 MG/1
TABLET ORAL
Qty: 90 TABLET | Refills: 0 | Status: SHIPPED | OUTPATIENT
Start: 2023-06-15

## 2023-06-15 RX ORDER — AMLODIPINE BESYLATE AND BENAZEPRIL HYDROCHLORIDE 10; 20 MG/1; MG/1
CAPSULE ORAL
Qty: 90 CAPSULE | Refills: 0 | Status: SHIPPED | OUTPATIENT
Start: 2023-06-15

## 2023-06-15 RX ORDER — LATANOPROST 50 UG/ML
SOLUTION/ DROPS OPHTHALMIC
Qty: 7.5 ML | Refills: 3 | Status: SHIPPED | OUTPATIENT
Start: 2023-06-15

## 2023-06-15 NOTE — TELEPHONE ENCOUNTER
Please review. Protocol failed / Has no protocol. Will make a phone attempt to patient to schedule an office visit with PCP. Requested Prescriptions   Pending Prescriptions Disp Refills    AMLODIPINE BESY-BENAZEPRIL HCL 10-20 MG Oral Cap [Pharmacy Med Name: amLODIPine Besy-Benazepril HCl 10-20 MG Oral Capsule] 90 capsule 0     Sig: Take 1 capsule by mouth once daily       Hypertensive Medications Protocol Failed - 6/14/2023  4:45 PM        Failed - In person appointment in the past 12 or next 3 months     Recent Outpatient Visits              2 months ago Glaucoma suspect of both eyes    [address], [address], [place]    Nurse Only    3 months ago Glaucoma suspect of both eyes    Karishma Garcia MD    Office Visit    1 year ago Post-operative [address], Carolin Nicolas MD    Office Visit    1 year ago Post-operative state    [address], Minus MD Nick    Office Visit    1 year ago Incarcerated left inguinal hernia    [address], [address], Carolin Nicolas MD    Office Visit          Future Appointments         Provider Department Appt Notes    In 4 weeks Lori Lowe MD [address], [address] [facility] EP/4 months IOP check               Failed - CMP or BMP in past 6 months     No results found for this or any previous visit (from the past 4392 hour(s)).             Failed - In person appointment or virtual visit in the past 6 months     Recent Outpatient Visits              2 months ago Glaucoma suspect of both eyes    [address], [address], [place]    Nurse Only    3 months ago Glaucoma suspect of both eyes    Karishma Garcia MD    Office Visit    1 year ago Post-operative [facility] Nicki Bernal MD    Office Visit    1 year ago Post-operative state    Hedy Frost, Drew James MD    Office Visit    1 year ago Incarcerated left inguinal hernia    [facility], Hedy Walker, Drew James MD    Office Visit          Future Appointments         Provider Department Appt Notes    In 4 weeks Brendan Hua MD [facility], [address], [place] EP/4 months IOP check               Failed - EGFRCR or GFRNAA > 50     GFR Evaluation            Failed - EGFRCR or GFRAA > 50     GFR Evaluation            Passed - Last BP reading less than 140/90     BP Readings from Last 1 Encounters:  10/21/21 : 135/58                IBUPROFEN 800 MG Oral Tab [Pharmacy Med Name: Ibuprofen 800 MG Oral Tablet] 90 tablet 0     Sig: TAKE 1 TABLET BY MOUTH EVERY 8 HOURS AS NEEDED       Non-Narcotic Pain Medication Protocol Failed - 6/14/2023  4:45 PM        Failed - In person appointment or virtual visit in the past 6 mos or appointment in next 3 mos     Recent Outpatient Visits              2 months ago Glaucoma suspect of both eyes    Donny Zafar, [address], [place]    Nurse Only    3 months ago Glaucoma suspect of both eyes    Belkis Issa MD    Office Visit    1 year ago Post-operative state    Shala Hernandez MD    Office Visit    1 year ago Post-operative state    Devyn Frost MD    Office Visit    1 year ago Incarcerated left inguinal hernia    Hedy Frost, Drew James MD    Office Visit          Future Appointments         Provider Department Appt Notes    In 4 weeks Brendan Hua MD [address] EP/4 months IOP check Future Appointments         Provider Department Appt Notes    In 4 weeks Eduarda Carcamo MD [address] EP/4 months IOP check           Recent Outpatient Visits              2 months ago Glaucoma suspect of both eyes    Cezar Phipps    Nurse Only    3 months ago Glaucoma suspect of both eyes    Yelena Barillas, [address], [place], Liliane Lopes MD    Office Visit    1 year ago Post-operative state    Kenya Schaffer MD    Office Visit    1 year ago Post-operative state    Yelena Barillas, Lina Fishman MD    Office Visit    1 year ago Incarcerated left inguinal hernia    Yelena Barillas, [address], Srinivasa Gerardo MD    Office Visit

## 2023-06-15 NOTE — TELEPHONE ENCOUNTER
Called patient asked to re-confirm his  and address but refused, can not released any information, patient dropped the call, please send letter to patient, patient don't have MyChart.

## 2023-06-15 NOTE — TELEPHONE ENCOUNTER
LDE: 3/14/23  Last visit: 3/14/23  Due for: IOP check   Upcoming visit: 7/13/23    Last Rx says zero refills.      Routed to [facility]

## 2023-06-23 ENCOUNTER — TELEPHONE (OUTPATIENT)
Dept: FAMILY MEDICINE CLINIC | Facility: CLINIC | Age: 88
End: 2023-06-23

## 2023-06-23 NOTE — TELEPHONE ENCOUNTER
Called patient, confirmed name and . Informed him of below. Difficult to determine if he is hard of hearing or slightly confused. He mentions that he is nervous and doing this \"all alone. \"    Re-informed him of below and he states he is already registered with senior services therefore he will call to arrange transportation for his upcoming optha appointment. Reviewed appointment information and he thought that the appointment was on . Advised that it is on . Patient verbalized understanding and agrees. Attempted to ask him if he has any family/friends to assist (no MAGO) and call disconnected. Attempted to call patient back and line busy.

## 2023-06-23 NOTE — TELEPHONE ENCOUNTER
Patient calling, confirmed name and . He requests to clarify his optha appointment date and time. Emergency contacts discussed with him. He explains that his niece Albino Henderson is his primary contact. He agrees to allow this nurse to call her to share below information with her so that she can assist him and agrees that it would be a good idea for Albino Henderson to attend his appointment with Dr. Kaiser Whitehead in September. Asked patient if he has difficulty managing his medications or is at risk for falling. He states that he is able to manage his medications and that he has fallen once. He states that he is concerned because his brother passed away after falling while at home alone. He also mentions developoing memory problems after one if his sons passed away.

## 2023-06-23 NOTE — TELEPHONE ENCOUNTER
Patient calling, confirmed name and . He requests to have his new cell phone listed as his primary phone number. Demographics updated. He received a letter that he believes is from the hospital informing him that he can get transportation for his appointments. He seems a bit confused. The phone number is [phone]. Staff at the above phone number explain that he needs to register with his area Senior Services at [phone] or by going to [employer] [place]. Then he can call [phone] to arrange for transportation 2 business days in advance (excludes weekends and holidays).

## 2023-07-13 ENCOUNTER — OFFICE VISIT (OUTPATIENT)
Dept: OPHTHALMOLOGY | Facility: CLINIC | Age: 88
End: 2023-07-13

## 2023-07-13 DIAGNOSIS — H40.1132 PRIMARY OPEN ANGLE GLAUCOMA (POAG) OF BOTH EYES, MODERATE STAGE: Primary | ICD-10-CM

## 2023-07-13 PROCEDURE — 99213 OFFICE O/P EST LOW 20 MIN: CPT | Performed by: OPHTHALMOLOGY

## 2023-07-13 PROCEDURE — 1126F AMNT PAIN NOTED NONE PRSNT: CPT | Performed by: OPHTHALMOLOGY

## 2023-07-13 PROCEDURE — 1159F MED LIST DOCD IN RCRD: CPT | Performed by: OPHTHALMOLOGY

## 2023-07-13 PROCEDURE — 1160F RVW MEDS BY RX/DR IN RCRD: CPT | Performed by: OPHTHALMOLOGY

## 2023-07-13 NOTE — PROGRESS NOTES
Maciel Saunders is a 82year old male. HPI:     HPI    Pt in today for an IOP check. Pt's son is here with pt today. Per pt is taking Latanoprost in both eyes QAM as directed (pt feels he remembers better to to take his drops in the morning). Pt states vision is stable and denies any ocular issues. Consult: per Dr. Magdiel Laird   Last edited by Mikael Woodall O.BRANDON on 7/13/2023 11:28 AM.        Patient History:  Past Medical History:   Diagnosis Date    Arthritis     Essential hypertension     Glaucoma 2018    patient seeing [facility] for the first time today, patient is taking Latanoprost OU QHS started by Dr. Adela Beck about 5 years ago    Hyperlipidemia     Visual impairment        Surgical History: Maciel Saunders has a past surgical history that includes Cataract extraction w/  intraocular lens implant (Right, 05/02/2017) Bernabe Vásquez MD) and Cataract extraction w/  intraocular lens implant (Left, 02/05/2013) Bernabe Vásquez MD). Family History   Problem Relation Age of Onset    Diabetes Neg     Glaucoma Neg     Macular degeneration Neg        Social History:   Social History     Socioeconomic History    Marital status:    Tobacco Use    Smoking status: Never    Smokeless tobacco: Never   Vaping Use    Vaping Use: Never used   Substance and Sexual Activity    Alcohol use: Yes     Comment: 1- 2 times per week     Drug use: Never       Medications:  Current Outpatient Medications   Medication Sig Dispense Refill    LATANOPROST 0.005 % Ophthalmic Solution INSTILL 1 DROP INTO EACH EYE ONCE DAILY AT NIGHT 7.5 mL 3    AMLODIPINE BESY-BENAZEPRIL HCL 10-20 MG Oral Cap Take 1 capsule by mouth once daily 90 capsule 0    IBUPROFEN 800 MG Oral Tab TAKE 1 TABLET BY MOUTH EVERY 8 HOURS AS NEEDED 90 tablet 0    LORazepam 0.5 MG Oral Tab Take 1 tablet (0.5 mg total) by mouth nightly. 30 tablet 0    Dutasteride 0.5 MG Oral Cap Take 1 capsule (0.5 mg total) by mouth daily.  90 capsule 0    melatonin 5 MG Oral Cap Take 5 mg by mouth nightly. Sildenafil Citrate 100 MG Oral Tab Take 100 mg by mouth daily as needed for Erectile Dysfunction. tiZANidine HCl 2 MG Oral Tab Take 2 mg by mouth 3 (three) times daily. (Patient not taking: No sig reported)      Pravastatin Sodium 40 MG Oral Tab Take 1 tablet (40 mg total) by mouth nightly. 90 tablet 0       Allergies:  No Known Allergies    ROS:     ROS    Positive for: Eyes  Negative for: Constitutional, Gastrointestinal, Neurological, Skin, Genitourinary, Musculoskeletal, HENT, Endocrine, Cardiovascular, Respiratory, Psychiatric, Allergic/Imm, Heme/Lymph  Last edited by Brandyn Wheatley OT on 7/13/2023 11:13 AM.          PHYSICAL EXAM:     Base Eye Exam       Visual Acuity (Snellen - Linear)         Right Left    Dist sc 20/40 -2 20/25 +2    Dist ph sc 20/25 -3               Tonometry (Applanation, 11:26 AM)         Right Left    Pressure 18 18              Pachymetry (3/23/2023)         Right Left    Thickness 530/ +1 544/ +0              Pupils         Pupils    Right PERRL    Left PERRL                  Slit Lamp and Fundus Exam       Slit Lamp Exam         Right Left    Lids/Lashes Dermatochalasis, Meibomian gland dysfunction Dermatochalasis, Meibomian gland dysfunction    Conjunctiva/Sclera Ocular Melanosis, no bleb Ocular Melanosis, no bleb    Cornea no Krukenberg's spindle no Krukenberg's spindle    Anterior Chamber Deep and quiet Deep and quiet    Iris No transillumination defects No transillumination defects    Lens PC IOL PC IOL              Fundus Exam         Right Left    Disc Good rim Good rim    C/D Ratio 0.8 0.65                  Refraction       Wearing Rx       Type: Forgot glasses                     ASSESSMENT/PLAN:     Diagnoses and Plan:     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 Continue taking Latanoprost in both eyes every morning. We will have patient back in 4 months for an IOP check.      No orders of the defined types were placed in this encounter. Meds This Visit:  Requested Prescriptions      No prescriptions requested or ordered in this encounter        Follow up instructions:  Return in about 4 months (around 11/13/2023) for IOP check.     7/13/2023  Scribed by: Vilma Parekh MD

## 2023-07-13 NOTE — ASSESSMENT & PLAN NOTE
IOP is stable. Continue taking Latanoprost in both eyes every morning. We will have patient back in 4 months for an IOP check.

## 2023-07-13 NOTE — PATIENT INSTRUCTIONS
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 Continue taking Latanoprost in both eyes every morning. We will have patient back in 4 months for an IOP check.

## 2023-07-19 ENCOUNTER — TELEPHONE (OUTPATIENT)
Dept: FAMILY MEDICINE CLINIC | Facility: CLINIC | Age: 88
End: 2023-07-19

## 2023-07-19 NOTE — TELEPHONE ENCOUNTER
Call from [facility], patient's son. Verified patient's name/. Informed not found on MAGO so cannot give out information. He states patient sees another PCP, Dr Burak Rogel, in [facility], close to patient's home. Wendelin Boxer is going to talk to patient about who he wants to keep following with as PCP. Wendelin Boxer will discuss with patient getting MAGO on record. No further request on this call.

## 2023-09-26 ENCOUNTER — OFFICE VISIT (OUTPATIENT)
Dept: FAMILY MEDICINE CLINIC | Facility: CLINIC | Age: 88
End: 2023-09-26

## 2023-09-26 VITALS
DIASTOLIC BLOOD PRESSURE: 63 MMHG | OXYGEN SATURATION: 99 % | BODY MASS INDEX: 20.19 KG/M2 | TEMPERATURE: 99 F | HEART RATE: 81 BPM | WEIGHT: 141 LBS | HEIGHT: 70 IN | SYSTOLIC BLOOD PRESSURE: 128 MMHG

## 2023-09-26 DIAGNOSIS — I10 ESSENTIAL HYPERTENSION: ICD-10-CM

## 2023-09-26 DIAGNOSIS — M54.50 CHRONIC BILATERAL LOW BACK PAIN, UNSPECIFIED WHETHER SCIATICA PRESENT: Primary | ICD-10-CM

## 2023-09-26 DIAGNOSIS — R60.0 BILATERAL LEG EDEMA: ICD-10-CM

## 2023-09-26 DIAGNOSIS — R29.898 WEAKNESS OF BOTH LOWER EXTREMITIES: ICD-10-CM

## 2023-09-26 DIAGNOSIS — G89.29 CHRONIC BILATERAL LOW BACK PAIN, UNSPECIFIED WHETHER SCIATICA PRESENT: Primary | ICD-10-CM

## 2023-09-26 PROCEDURE — 1170F FXNL STATUS ASSESSED: CPT | Performed by: FAMILY MEDICINE

## 2023-09-26 PROCEDURE — 3078F DIAST BP <80 MM HG: CPT | Performed by: FAMILY MEDICINE

## 2023-09-26 PROCEDURE — 3074F SYST BP LT 130 MM HG: CPT | Performed by: FAMILY MEDICINE

## 2023-09-26 PROCEDURE — 1159F MED LIST DOCD IN RCRD: CPT | Performed by: FAMILY MEDICINE

## 2023-09-26 PROCEDURE — 3008F BODY MASS INDEX DOCD: CPT | Performed by: FAMILY MEDICINE

## 2023-09-26 PROCEDURE — 99214 OFFICE O/P EST MOD 30 MIN: CPT | Performed by: FAMILY MEDICINE

## 2023-09-26 PROCEDURE — 1125F AMNT PAIN NOTED PAIN PRSNT: CPT | Performed by: FAMILY MEDICINE

## 2023-09-26 RX ORDER — POTASSIUM CHLORIDE 750 MG/1
10 TABLET, EXTENDED RELEASE ORAL 2 TIMES DAILY WITH MEALS
COMMUNITY
Start: 2023-08-23

## 2023-09-26 RX ORDER — FUROSEMIDE 40 MG/1
40 TABLET ORAL DAILY
COMMUNITY
Start: 2023-08-21

## 2023-09-26 NOTE — PROGRESS NOTES
Subjective:     Patient ID: Eladio Johns is a 82year old male. This patient is a 68-year-old -American gentleman who presents to the clinic with progressive weakness in the bilateral lower extremities which has necessitated a cane for ambulatory stability. The patient does have a history for chronic low back discomfort. Per the patient and the individual accompanying him at this visit, the patient has not had an x-ray of his lower back. Additionally the patient is here to follow-up on a work-up regarding bilateral lower extremity fluid retention. The patient has been seen by medical personnel and an EKG as well as chest x-ray has been recommended. Patient did have some lab work done which included a BNP which was normal.    We have had an extensive discussion and we will get venous insufficiency imaging performed along with an echocardiogram to further assess the cause for this fluid retention which is not fully responding to furosemide he has been placed on by an external physician. Regarding hypertension, patient denies headaches, chest pain, dizziness, shortness of breath, visual changes, and/or exertional fatigue. History/Other:   Review of Systems  Current Outpatient Medications   Medication Sig Dispense Refill    furosemide 40 MG Oral Tab Take 1 tablet (40 mg total) by mouth daily. potassium chloride 10 MEQ Oral Tab CR Take 1 tablet (10 mEq total) by mouth 2 (two) times daily with meals. LATANOPROST 0.005 % Ophthalmic Solution INSTILL 1 DROP INTO EACH EYE ONCE DAILY AT NIGHT 7.5 mL 3    AMLODIPINE BESY-BENAZEPRIL HCL 10-20 MG Oral Cap Take 1 capsule by mouth once daily 90 capsule 0    IBUPROFEN 800 MG Oral Tab TAKE 1 TABLET BY MOUTH EVERY 8 HOURS AS NEEDED 90 tablet 0    LORazepam 0.5 MG Oral Tab Take 1 tablet (0.5 mg total) by mouth nightly. 30 tablet 0    Dutasteride 0.5 MG Oral Cap Take 1 capsule (0.5 mg total) by mouth daily.  90 capsule 0    melatonin 5 MG Oral Cap Take 5 mg by mouth nightly. Sildenafil Citrate 100 MG Oral Tab Take 100 mg by mouth daily as needed for Erectile Dysfunction. tiZANidine HCl 2 MG Oral Tab Take 2 mg by mouth 3 (three) times daily. (Patient not taking: No sig reported)      Pravastatin Sodium 40 MG Oral Tab Take 1 tablet (40 mg total) by mouth nightly. 90 tablet 0     Allergies:No Known Allergies    Past Medical History:   Diagnosis Date    Arthritis     Essential hypertension     Glaucoma 2018    patient seeing [facility] for the [facility] time today, patient is taking Latanoprost OU QHS started by Dr. Jennifer Johnson about 5 years ago    Hyperlipidemia     Visual impairment       Past Surgical History:   Procedure Laterality Date    CATARACT EXTRACTION W/  INTRAOCULAR LENS IMPLANT Right 2017    Swathi Gaston MD    CATARACT EXTRACTION W/  INTRAOCULAR LENS IMPLANT Left 2013    Swathi Gaston MD      Family History   Problem Relation Age of Onset    Diabetes Neg     Glaucoma Neg     Macular degeneration Neg       Social History:   Social History     Socioeconomic History    Marital status:    Tobacco Use    Smoking status: Never    Smokeless tobacco: Never   Vaping Use    Vaping Use: Never used   Substance and Sexual Activity    Alcohol use: Yes     Comment: 1- 2 times per week     Drug use: Never        Objective:    23  1337   BP: 128/63   Pulse:    Temp:        Physical Exam  Constitutional:       General: He is not in acute distress. Appearance: Normal appearance. He is not ill-appearing. HENT:      Head: Normocephalic and atraumatic. Cardiovascular:      Rate and Rhythm: Normal rate and regular rhythm. Pulses:           Posterior tibial pulses are 2+ on the left side. Heart sounds: Murmur heard. No gallop. Pulmonary:      Effort: Pulmonary effort is normal.      Breath sounds: Normal breath sounds. Musculoskeletal:      Right lower le+ Pitting Edema present.       Left lower le+ Pitting Edema present. Neurological:      Mental Status: He is alert and oriented to person, place, and time. Assessment & Plan:   1. Chronic bilateral low back pain, unspecified whether sciatica present  Ordered. - XR LUMBAR SPINE (MIN 4 VIEWS) (CPT=72110); Future    2. Bilateral leg edema  Ordered. - US VENOUS INSUFFICIENCY (REFLUX) BILAT LOWER EXT SH(CPT=93970); Future    3. Weakness of both lower extremities  Likely secondary to lumbar degenerative process including the potential for central and foraminal stenosis. 4. Essential hypertension  To goal.        No orders of the defined types were placed in this encounter. Meds This Visit:  Requested Prescriptions      No prescriptions requested or ordered in this encounter       Imaging & Referrals:  XR LUMBAR SPINE (MIN 4 VIEWS) (CPT=72110)  US VENOUS DOPPLER LEG BILAT - DIAG IMG (CPT=93970)  CARD ECHO 2D DOPPLER (CPT=93306)     Patient Instructions   Echocardiogram ordered. Venous imaging bilateral lower extremities also ordered. Patient will likely need OT and possibly lymphedema clinic. For now the patient can continue with furosemide, but we will reassess that once venous imaging is available. It is okay for the patient to proceed to get the chest x-ray. X-rays to be ordered for the lumbar spine in order to determine whether the patient has a degenerative process causing lumbar canal and/or foraminal stenosis. Return in about 6 weeks (around 11/7/2023), or if symptoms worsen or fail to improve.

## 2023-09-26 NOTE — PATIENT INSTRUCTIONS
Echocardiogram ordered. Venous imaging bilateral lower extremities also ordered. Patient will likely need OT and possibly lymphedema clinic. For now the patient can continue with furosemide, but we will reassess that once venous imaging is available. It is okay for the patient to proceed to get the chest x-ray. X-rays to be ordered for the lumbar spine in order to determine whether the patient has a degenerative process causing lumbar canal and/or foraminal stenosis.

## 2023-10-05 RX ORDER — IBUPROFEN 800 MG/1
800 TABLET ORAL EVERY 8 HOURS PRN
Qty: 90 TABLET | Refills: 0 | Status: SHIPPED | OUTPATIENT
Start: 2023-10-05

## 2023-10-05 NOTE — TELEPHONE ENCOUNTER
Refill passed per [facility] protocol.    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, moderate stage    [facility] Moi Lucas MD    Office Visit    6 months ago Glaucoma suspect of both eyes    [facility]    Nurse Only    6 months ago Glaucoma suspect of both eyes    [facility] Moi Lucas MD    Office Visit    1 year ago Post-operative state    [facility] Mehran Bruno MD    Office Visit          Future Appointments         Provider Department Appt Notes    In 5 days [facility] US RM4 [facility] Ultrasound Orders in EPIC    In 5 days [facility] CARD RM2 [facility] Cardiodiagnostics Orders in EPIC    In 1 week [facility] US RM4 [facility] Ultrasound Orders in EPIC    In 1 week [facility] XR [address] X-ray - [place] [facility] Orders in EPIC    In 1 week [facility] XR [address] [facility]-ray [facility] LUCY    In 3 weeks Austin Mccann DO [facility] Physical overdue, policy informed (scheduled by son)    In 1 month Austin Mccann DO [facility] 6 week follow up per doctor    In 1 month Moi Lucas MD  [facility] EP/ IOP check                       Future Appointments         Provider Department Appt Notes    In 5 days EM US RM4 VAS [facility] Ultrasound Orders in EPIC    In 5 days [facility] CARD RM2 ECHO [facility] Cardiodiagnostics Orders in EPIC    In 1 week [facility] US RM4 VAS [facility] Ultrasound Orders in EPIC    In 1 week [facility] XR RM1 [facility] Orders in EPIC    In 1 week CF XR RM1 [facility]ray [facility] LUCY    In 3 weeks Austin Mccann,  [facility] Physical overdue, policy informed (scheduled by son)    In 1 month Austin Mccann DO [facility] 6 week follow up per doctor    In 1 month Moi Lucas MD 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    Nurse Only    6 months ago [facility] both [facility]    [facility]    1 year ago Post-operative state    [facility] Mehran Bruno MD    Office Visit

## 2023-10-09 RX ORDER — IBUPROFEN 800 MG/1
800 TABLET ORAL EVERY 8 HOURS PRN
Qty: 90 TABLET | Refills: 0 | OUTPATIENT
Start: 2023-10-09

## 2023-10-10 ENCOUNTER — HOSPITAL ENCOUNTER (OUTPATIENT)
Dept: ULTRASOUND IMAGING | Facility: HOSPITAL | Age: 88
Discharge: HOME | End: 2023-10-10
Attending: FAMILY MEDICINE
Payer: MEDICARE

## 2023-10-10 ENCOUNTER — HOSPITAL ENCOUNTER (OUTPATIENT)
Dept: CV DIAGNOSTICS | Facility: HOSPITAL | Age: 88
Discharge: HOME | End: 2023-10-10
Attending: FAMILY MEDICINE
Payer: MEDICARE

## 2023-10-10 DIAGNOSIS — R60.0 BILATERAL LEG EDEMA: ICD-10-CM

## 2023-10-10 PROCEDURE — 93306 TTE W/DOPPLER COMPLETE: CPT | Performed by: FAMILY MEDICINE

## 2023-10-10 PROCEDURE — 93970 EXTREMITY STUDY: CPT | Performed by: FAMILY MEDICINE

## 2023-10-13 ENCOUNTER — HOSPITAL ENCOUNTER (OUTPATIENT)
Dept: GENERAL RADIOLOGY | Facility: HOSPITAL | Age: 88
Discharge: HOME | End: 2023-10-13
Attending: FAMILY MEDICINE
Payer: MEDICARE

## 2023-10-13 ENCOUNTER — HOSPITAL ENCOUNTER (OUTPATIENT)
Dept: ULTRASOUND IMAGING | Facility: HOSPITAL | Age: 88
Discharge: HOME | End: 2023-10-13
Attending: FAMILY MEDICINE
Payer: MEDICARE

## 2023-10-13 DIAGNOSIS — G89.29 CHRONIC BILATERAL LOW BACK PAIN, UNSPECIFIED WHETHER SCIATICA PRESENT: ICD-10-CM

## 2023-10-13 DIAGNOSIS — M54.50 CHRONIC BILATERAL LOW BACK PAIN, UNSPECIFIED WHETHER SCIATICA PRESENT: ICD-10-CM

## 2023-10-13 DIAGNOSIS — R60.0 LEG EDEMA: ICD-10-CM

## 2023-10-13 DIAGNOSIS — R60.0 BILATERAL LEG EDEMA: ICD-10-CM

## 2023-10-13 PROCEDURE — 93970 EXTREMITY STUDY: CPT | Performed by: FAMILY MEDICINE

## 2023-10-13 PROCEDURE — 72110 X-RAY EXAM L-2 SPINE 4/>VWS: CPT | Performed by: FAMILY MEDICINE

## 2023-10-13 PROCEDURE — 71046 X-RAY EXAM CHEST 2 VIEWS: CPT | Performed by: FAMILY MEDICINE

## 2023-10-26 ENCOUNTER — OFFICE VISIT (OUTPATIENT)
Dept: FAMILY MEDICINE CLINIC | Facility: CLINIC | Age: 88
End: 2023-10-26

## 2023-10-26 ENCOUNTER — LAB ENCOUNTER (OUTPATIENT)
Dept: LAB | Age: 88
End: 2023-10-26
Attending: FAMILY MEDICINE

## 2023-10-26 VITALS
DIASTOLIC BLOOD PRESSURE: 62 MMHG | HEIGHT: 67.32 IN | RESPIRATION RATE: 16 BRPM | HEART RATE: 78 BPM | TEMPERATURE: 99 F | WEIGHT: 138.63 LBS | SYSTOLIC BLOOD PRESSURE: 122 MMHG | BODY MASS INDEX: 21.5 KG/M2 | OXYGEN SATURATION: 98 %

## 2023-10-26 DIAGNOSIS — G89.29 CHRONIC BILATERAL LOW BACK PAIN, UNSPECIFIED WHETHER SCIATICA PRESENT: ICD-10-CM

## 2023-10-26 DIAGNOSIS — Z23 INFLUENZA VACCINE NEEDED: ICD-10-CM

## 2023-10-26 DIAGNOSIS — Z12.5 ENCOUNTER FOR PROSTATE CANCER SCREENING: ICD-10-CM

## 2023-10-26 DIAGNOSIS — Z00.00 MEDICARE ANNUAL WELLNESS VISIT, INITIAL: ICD-10-CM

## 2023-10-26 DIAGNOSIS — I10 ESSENTIAL HYPERTENSION: ICD-10-CM

## 2023-10-26 DIAGNOSIS — R29.898 WEAKNESS OF BOTH LOWER EXTREMITIES: ICD-10-CM

## 2023-10-26 DIAGNOSIS — R60.0 BILATERAL LEG EDEMA: ICD-10-CM

## 2023-10-26 DIAGNOSIS — Z91.89 AT RISK FOR FOOT PROBLEM: Primary | ICD-10-CM

## 2023-10-26 DIAGNOSIS — Z23 NEED FOR ZOSTER VACCINATION: ICD-10-CM

## 2023-10-26 DIAGNOSIS — M54.50 CHRONIC BILATERAL LOW BACK PAIN, UNSPECIFIED WHETHER SCIATICA PRESENT: ICD-10-CM

## 2023-10-26 DIAGNOSIS — H40.9 GLAUCOMA OF BOTH EYES, UNSPECIFIED GLAUCOMA TYPE: ICD-10-CM

## 2023-10-26 DIAGNOSIS — M48.061 LUMBAR FORAMINAL STENOSIS: ICD-10-CM

## 2023-10-26 RX ORDER — ZOSTER VACCINE RECOMBINANT, ADJUVANTED 50 MCG/0.5
50 KIT INTRAMUSCULAR ONCE
Qty: 2 EACH | Refills: 0 | Status: SHIPPED | OUTPATIENT
Start: 2023-10-26 | End: 2023-10-26

## 2023-10-26 RX ORDER — BENAZEPRIL HYDROCHLORIDE AND HYDROCHLOROTHIAZIDE 20; 12.5 MG/1; MG/1
1 TABLET ORAL DAILY
Qty: 90 TABLET | Refills: 0 | Status: SHIPPED | OUTPATIENT
Start: 2023-10-26

## 2023-10-26 NOTE — PATIENT INSTRUCTIONS
We will hold on the amlodipine/benazepril combo medication that taken daily at 10/20 mg in lieu of the fact that the echo shows normal heart function without any issue to the right side of the heart and venous imaging does not support venous insufficiency. Amlodipine may be causing the fluid retention issue especially at the 10 mg dosage. We will prescribe an ACE inhibitor with HCTZ in order to remove the amlodipine and also have a maintenance water tablet in order to reduce some of the fluid retention. Medication reviewed and renewed where needed and appropriate. Comply with medications. Monitor blood pressures and record at home. Limit salt intake. To physiatry regarding lumbar foraminal stenosis. To podiatry for a myriad of foot problems.

## 2023-10-26 NOTE — PROGRESS NOTES
Subjective:     Patient ID: Mariama Baltazar is a 82year old male. This patient is an 45-year-old hypertensive -American gentleman who is here for Medicare annual visit which will also satisfy all the requirements for a complete preventive care physical and for status update on any confirmed chronic medical illnesses and follow up on any previous labs or procedures that were suggestive or in need of further work up. Colon assessment no longer due secondary to the patient's age. Bowel and bladder functions are intact. Patient also doing a follow-up regarding the work-up for bilateral lower extremity fluid retention. Echocardiogram is normal and does not have any findings consistent with right-sided heart dysfunction. Patient's venous imaging ruled out both venous insufficiency as well as DVT. Patient is eligible for both shingles vaccine and also seasonal influenza vaccine. Patient's low back films reveal lumbar foraminal stenosis at the L4-L5 and in the L5-S1 levels. Patient being referred to physiatry for the neck step in assessment and possible conservative care. History/Other:   Review of Systems  Current Outpatient Medications   Medication Sig Dispense Refill    Zoster Vac Recomb Adjuvanted (SHINGRIX) 50 MCG/0.5ML Intramuscular Recon Susp Inject 50 mcg into the muscle one time for 1 dose. 2 each 0    Benazepril-hydroCHLOROthiazide 20-12.5 MG Oral Tab Take 1 tablet by mouth daily. 90 tablet 0    ibuprofen 800 MG Oral Tab Take 1 tablet (800 mg total) by mouth every 8 (eight) hours as needed for Pain. 90 tablet 0    furosemide 40 MG Oral Tab Take 1 tablet (40 mg total) by mouth daily. potassium chloride 10 MEQ Oral Tab CR Take 1 tablet (10 mEq total) by mouth 2 (two) times daily with meals.       AMLODIPINE BESY-BENAZEPRIL HCL 10-20 MG Oral Cap Take 1 capsule by mouth once daily 90 capsule 0    LATANOPROST 0.005 % Ophthalmic Solution INSTILL 1 DROP INTO EACH EYE ONCE DAILY AT NIGHT 7.5 mL 3    LORazepam 0.5 MG Oral Tab Take 1 tablet (0.5 mg total) by mouth nightly. 30 tablet 0    Dutasteride 0.5 MG Oral Cap Take 1 capsule (0.5 mg total) by mouth daily. 90 capsule 0    melatonin 5 MG Oral Cap Take 1 capsule (5 mg total) by mouth nightly. Sildenafil Citrate 100 MG Oral Tab Take 1 tablet (100 mg total) by mouth daily as needed for Erectile Dysfunction. tiZANidine HCl 2 MG Oral Tab Take 1 tablet (2 mg total) by mouth 3 (three) times daily. Pravastatin Sodium 40 MG Oral Tab Take 1 tablet (40 mg total) by mouth nightly. 90 tablet 0     Allergies:No Known Allergies    Past Medical History:   Diagnosis Date    Arthritis     Essential hypertension     Glaucoma 2018    patient seeing [facility] for the first time today, patient is taking Latanoprost OU QHS started by Dr. Bebe García about 5 years ago    Hyperlipidemia     Visual impairment       Past Surgical History:   Procedure Laterality Date    CATARACT EXTRACTION W/  INTRAOCULAR LENS IMPLANT Right 05/02/2017    Harsha Harper MD    CATARACT EXTRACTION W/  INTRAOCULAR LENS IMPLANT Left 02/05/2013    Harsha Harper MD      Family History   Problem Relation Age of Onset    Diabetes Neg     Glaucoma Neg     Macular degeneration Neg       Social History:   Social History     Socioeconomic History    Marital status:     Tobacco Use    Smoking status: Never     Passive exposure: Never    Smokeless tobacco: Never   Vaping Use    Vaping Use: Never used   Substance and Sexual Activity    Alcohol use: Yes     Comment: 1- 2 times per week     Drug use: Never   Other Topics Concern     Service No    Blood Transfusions No    Caffeine Concern No    Occupational Exposure No    Hobby Hazards No    Sleep Concern No    Stress Concern No    Weight Concern No    Special Diet No    Back Care No    Exercise No    Bike Helmet No    Seat Belt No    Self-Exams No        Jaylan Ibanez's SCREENING SCHEDULE   Tests on this list are recommended by your physician but may not be covered, or covered at this frequency, by your insurer. Please check with your insurance carrier before scheduling to verify coverage. PREVENTATIVE SERVICES  INDICATIONS AND SCHEDULE Internal Lab or Procedure External Lab or Procedure   Diabetes Screening      HbgA1C   Annually No results found for: \"A1C\"      No data to display                Fasting Blood Sugar (FSB) Annually Glucose (mg/dL)   Date Value   10/18/2021 98       Cardiovascular Disease Screening     LDL Annually LDL Cholesterol (mg/dL)   Date Value   04/29/2021 95        EKG One Time      Colorectal Cancer Screening      Colonoscopy Screen every 10 years No recommendations at this time Update Health Maintenance if applicable    Flex Sigmoidoscopy Screen every 5 years No results found for this or any previous visit. No data to display                 Fecal Occult Blood Annually No results found for: \"FOB\", \"OCCULTSTOOL\"      No data to display                Glaucoma Screening      Ophthalmology Visit Annually      Prostate Cancer Screening      PSA  Annually There are no preventive care reminders to display for this patient. Update Health Maintenance if applicable   Immunizations      Influenza No orders found for this or any previous visit. Update Immunization Activity if applicable    Pneumococcal No orders found for this or any previous visit. Update Immunization Activity if applicable    Hepatitis B No orders found for this or any previous visit. Update Immunization Activity if applicable    Tetanus No orders found for this or any previous visit. Update Immunization Activity if applicable    Zoster (Not covered by Medicare Part B) No orders found for this or any previous visit.  Update Immunization Activity if applicable     SPECIFIC DISEASE MONITORING Internal Lab or Procedure External Lab or Procedure   Annual Monitoring of Persistent     Medications (ACE/ARB, digoxin, diuretics)    Potassium  Annually Potassium (mmol/L)   Date Value   10/18/2021 3.4 (L)         No data to display                Creatinine  Annually Creatinine (mg/dL)   Date Value   10/18/2021 0.61 (L)         No data to display                Digoxin Serum Conc  Annually No results found for: \"DIGOXIN\"      No data to display                Diabetes      HgbA1C  Annually No results found for: \"A1C\"      No data to display                Creat/alb ratio  Annually      LDL  Annually LDL Cholesterol (mg/dL)   Date Value   04/29/2021 95         No data to display                 Dilated Eye exam  Annually      No data to display                   No data to display                COPD      Spirometry Testing Annually No results found for this or any previous visit.       No data to display                    General Health     In the past six months, have you lost more than 10 pounds without trying?: 3 - Don't know    Has your appetite been poor?: Yes    Type of Diet: Other    How does the patient maintain a good energy level?: Other    How would you describe your daily physical activity?: None    How would you describe your current health state?: Fair    How do you maintain positive mental well-being?: Visiting Family         Have you had any immunizations at another office such as Influenza, Hepatitis B, Tetanus, or Pneumococcal?: No     Functional Ability     Bathing or Showering: Able without help    Toileting: Able without help    Dressing: Able without help    Eating: Able without help    Driving: Able without help    Preparing your meals: Able without help    Managing money/bills: Able without help    Taking medications as prescribed: Able without help    Are you able to afford your medications?: Yes    Hearing Problems?: No     Functional Status     Hearing Problems?: No    Vision Problems? : No    Difficulty walking?: Yes    Difficulty dressing or bathing?: No    Problems with daily activities? : No    Memory Problems?: No      Fall/Risk Assessment Depression Screening (PHQ-2/PHQ-9): Over the LAST 2 WEEKS                 1. Little interest or pleasure in doing things: More than half the days  2. Feeling down, depressed, or hopeless: More than half the days  3. Trouble falling or staying asleep, or sleeping too much: More than half the days  4. Feeling tired or having little energy: More than half the days  5. Poor appetite or overeating: Not at all  6. Feeling bad about yourself - or that you are a failure or have let yourself or your family down: Not at all  7. Trouble concentrating on things, such as reading the newspaper or watching television: More than half the days  8. Moving or speaking so slowly that other people could have noticed. Or the opposite - being so fidgety or restless that you have been moving around a lot more than usual: Not at all  9. Thoughts that you would be better off dead, or of hurting yourself in some way: Not at all  PHQ-9 TOTAL SCORE: 10  If you checked off any problems, how difficult have these problems made it for you to do your work, take care of things at home, or get along with other people?: Very difficult      Advance Directives     Do you have a healthcare power of ?: No    Do you have a living will?: No     Hearing Assessment (Required for AWV/SWV)      Hearing Screening    Time taken: 10/26/2023  2:47 PM  Entry User: Jong Solis MA  Screening Method: Finger Rub  Finger Rub Result: Pass               Visual Acuity     Right Eye Visual Acuity: Uncorrected Left Eye Visual Acuity: Uncorrected   Right Eye Chart Acuity: 20/40 Left Eye Chart Acuity: 20/30     Cognitive Assessment     What day of the week is this?: Correct    What month is it?: Incorrect    What year is it?: Incorrect    Recall \"Ball\": Incorrect    Recall \"Flag\": Correct    Recall \"Tree\":  Incorrect          Objective:    10/26/23  1520   BP: 122/62   Pulse:    Resp:    Temp:        Physical Exam  HENT:      Head: Normocephalic. Right Ear: Tympanic membrane normal.      Left Ear: Tympanic membrane normal.      Nose: Nose normal.      Mouth/Throat:      Mouth: Mucous membranes are moist.   Cardiovascular:      Rate and Rhythm: Regular rhythm. Heart sounds: Murmur heard. Systolic murmur is present with a grade of 2/6. No S3 sounds. Pulmonary:      Breath sounds: Normal breath sounds. Musculoskeletal:      Right lower le+ Edema present. Left lower le+ Edema present. Neurological:      Mental Status: He is alert. Assessment & Plan:   1. Medicare annual wellness visit, initial  Survey completed. The following labs have been ordered. - COMP METABOLIC PANEL [95558] [Q]  - CBC [6399] [Q]  - LIPID PANEL [6010] [Q]  - TSH W REFLEX TO FREE T4 [47818][Q]  - URINALYSIS, ROUTINE [3351][Q]    2. At risk for foot problem  Referred. - Podiatry Referral - In Network    3. Lumbar foraminal stenosis  Referred. - Physiatry Referral - In Network    4. Essential hypertension  Hold on amlodipine/benazepril 10/20 and the following medication has been prescribed instead. - Benazepril-hydroCHLOROthiazide 20-12.5 MG Oral Tab; Take 1 tablet by mouth daily. Dispense: 90 tablet; Refill: 0    5. Glaucoma of both eyes, unspecified glaucoma type  Continue with ophthalmologist for    6. Chronic bilateral low back pain, unspecified whether sciatica present  Patient being referred to physiatry. 7. Bilateral leg edema  May be medication side effect secondary to amlodipine combination medication. See patient instructions. 8. Weakness of both lower extremities  Likely secondary to chronic foraminal stenosis and nerve compression. 9. Influenza vaccine needed  Ordered and administered on today. - Fluzone High Dose  65 years and older [96956]    10. Need for zoster vaccination  Prescription for zosters vaccine sent to the pharmacy.   - Zoster Vac Recomb Adjuvanted ([address]) 50 MCG/0.5ML Intramuscular Recon Susp; Inject 50 mcg into the muscle one time for 1 dose. Dispense: 2 each; Refill: 0    11. Encounter for prostate cancer screening  Ordered. - PSA, TOTAL W REFLEX TO PSA, FREE [07860][Q]        Orders Placed This Encounter      COMP METABOLIC PANEL [82341] [Q]      CBC [6399] [Q]      LIPID PANEL [7600] [Q]      TSH W REFLEX TO FREE T4 [10209][Q]      PSA, TOTAL W REFLEX TO PSA, FREE [99498][Q]      URINALYSIS, ROUTINE [3363][Q]      Fluzone High Dose  65 years and older [25211]      Meds This Visit:  Requested Prescriptions     Signed Prescriptions Disp Refills    Zoster Vac Recomb Adjuvanted (SHINGRIX) 50 MCG/0.5ML Intramuscular Recon Susp 2 each 0     Sig: Inject 50 mcg into the muscle one time for 1 dose. Benazepril-hydroCHLOROthiazide 20-12.5 MG Oral Tab 90 tablet 0     Sig: Take 1 tablet by mouth daily. Imaging & Referrals:  FLU VACC HIGH DOSE PRSV FREE  PODIATRY - INTERNAL  PHYSIATRY - INTERNAL     Patient Instructions   We will hold on the amlodipine/benazepril combo medication that taken daily at 10/20 mg in lieu of the fact that the echo shows normal heart function without any issue to the right side of the heart and venous imaging does not support venous insufficiency. Amlodipine may be causing the fluid retention issue especially at the 10 mg dosage. We will prescribe an ACE inhibitor with HCTZ in order to remove the amlodipine and also have a maintenance water tablet in order to reduce some of the fluid retention. Medication reviewed and renewed where needed and appropriate. Comply with medications. Monitor blood pressures and record at home. Limit salt intake. To physiatry regarding lumbar foraminal stenosis. To podiatry for a myriad of foot problems. Return in about 1 year (around 10/26/2024), or if symptoms worsen or fail to improve.

## 2023-10-28 LAB
ABSOLUTE BASOPHILS: 41 CELLS/UL (ref 0–200)
ABSOLUTE EOSINOPHILS: 90 CELLS/UL (ref 15–500)
ABSOLUTE LYMPHOCYTES: 3870 CELLS/UL (ref 850–3900)
ABSOLUTE MONOCYTES: 877 CELLS/UL (ref 200–950)
ABSOLUTE NEUTROPHILS: 3321 CELLS/UL (ref 1500–7800)
ALBUMIN/GLOBULIN RATIO: 1.6 (CALC) (ref 1–2.5)
ALBUMIN: 4 G/DL (ref 3.6–5.1)
ALKALINE PHOSPHATASE: 64 U/L (ref 35–144)
ALT: 14 U/L (ref 9–46)
AST: 20 U/L (ref 10–35)
BASOPHILS: 0.5 %
BILIRUBIN, TOTAL: 0.7 MG/DL (ref 0.2–1.2)
BUN: 10 MG/DL (ref 7–25)
CALCIUM: 9.7 MG/DL (ref 8.6–10.3)
CARBON DIOXIDE: 31 MMOL/L (ref 20–32)
CHLORIDE: 102 MMOL/L (ref 98–110)
CHOL/HDLC RATIO: 2.3 (CALC)
CHOLESTEROL, TOTAL: 161 MG/DL
CREATININE: 0.71 MG/DL (ref 0.7–1.22)
EGFR: 89 ML/MIN/1.73M2
EOSINOPHILS: 1.1 %
GLOBULIN: 2.5 G/DL (CALC) (ref 1.9–3.7)
GLUCOSE: 83 MG/DL (ref 65–99)
HDL CHOLESTEROL: 69 MG/DL
HEMATOCRIT: 28.9 % (ref 38.5–50)
HEMOGLOBIN: 9.4 G/DL (ref 13.2–17.1)
LDL-CHOLESTEROL: 81 MG/DL (CALC)
LYMPHOCYTES: 47.2 %
MCH: 28.7 PG (ref 27–33)
MCHC: 32.5 G/DL (ref 32–36)
MCV: 88.1 FL (ref 80–100)
MONOCYTES: 10.7 %
MPV: 10.1 FL (ref 7.5–12.5)
NEUTROPHILS: 40.5 %
NON-HDL CHOLESTEROL: 92 MG/DL (CALC)
PLATELET COUNT: 341 THOUSAND/UL (ref 140–400)
POTASSIUM: 3.7 MMOL/L (ref 3.5–5.3)
PROTEIN, TOTAL: 6.5 G/DL (ref 6.1–8.1)
RDW: 13.8 % (ref 11–15)
RED BLOOD CELL COUNT: 3.28 MILLION/UL (ref 4.2–5.8)
SODIUM: 142 MMOL/L (ref 135–146)
TOTAL PSA: 0.5 NG/ML
TRIGLYCERIDES: 37 MG/DL
TSH W/REFLEX TO FT4: 3.89 MIU/L (ref 0.4–4.5)
WHITE BLOOD CELL COUNT: 8.2 THOUSAND/UL (ref 3.8–10.8)

## 2023-11-02 DIAGNOSIS — D64.9 ANEMIA, UNSPECIFIED TYPE: Primary | ICD-10-CM

## 2023-11-03 ENCOUNTER — LAB ENCOUNTER (OUTPATIENT)
Dept: LAB | Age: 88
End: 2023-11-03
Attending: FAMILY MEDICINE
Payer: MEDICARE

## 2023-11-03 DIAGNOSIS — Z00.00 MEDICARE ANNUAL WELLNESS VISIT, INITIAL: Primary | ICD-10-CM

## 2023-11-04 LAB
BILIRUBIN: NEGATIVE
COLOR: YELLOW
GLUCOSE: NEGATIVE
KETONES: NEGATIVE
NITRITE: NEGATIVE
OCCULT BLOOD: NEGATIVE
PH: 6 (ref 5–8)
PROTEIN: NEGATIVE
SPECIFIC GRAVITY: 1.01 (ref 1–1.03)

## 2023-11-10 DIAGNOSIS — R82.71 BACTERIA IN URINE: Primary | ICD-10-CM

## 2023-11-13 ENCOUNTER — TELEPHONE (OUTPATIENT)
Dept: FAMILY MEDICINE CLINIC | Facility: CLINIC | Age: 88
End: 2023-11-13

## 2023-11-13 DIAGNOSIS — I10 ESSENTIAL HYPERTENSION: ICD-10-CM

## 2023-11-13 NOTE — TELEPHONE ENCOUNTER
Dago (son) wanted [facility]PO staff to be aware will stop by office tomorrow morning to  urinary hat for pt for urine collection. Believes pt may have an urinary infection. OPO staff to place supply for pt at  for pickup. Thank you.     Please reply to pool: EM RN Nelma Spurling

## 2023-11-14 ENCOUNTER — OFFICE VISIT (OUTPATIENT)
Dept: OPHTHALMOLOGY | Facility: CLINIC | Age: 88
End: 2023-11-14
Payer: MEDICARE

## 2023-11-14 ENCOUNTER — TELEPHONE (OUTPATIENT)
Dept: FAMILY MEDICINE CLINIC | Facility: CLINIC | Age: 88
End: 2023-11-14

## 2023-11-14 DIAGNOSIS — H40.1132 PRIMARY OPEN ANGLE GLAUCOMA (POAG) OF BOTH EYES, MODERATE STAGE: Primary | ICD-10-CM

## 2023-11-14 PROCEDURE — 1160F RVW MEDS BY RX/DR IN RCRD: CPT | Performed by: OPHTHALMOLOGY

## 2023-11-14 PROCEDURE — 99213 OFFICE O/P EST LOW 20 MIN: CPT | Performed by: OPHTHALMOLOGY

## 2023-11-14 PROCEDURE — 1159F MED LIST DOCD IN RCRD: CPT | Performed by: OPHTHALMOLOGY

## 2023-11-14 RX ORDER — BENAZEPRIL HYDROCHLORIDE AND HYDROCHLOROTHIAZIDE 20; 12.5 MG/1; MG/1
1 TABLET ORAL DAILY
Qty: 90 TABLET | Refills: 3 | Status: SHIPPED | OUTPATIENT
Start: 2023-11-14

## 2023-11-14 NOTE — PATIENT INSTRUCTIONS
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 Continue taking Latanoprost in both eyes every morning.      Will see patient in 4 months for a visual field, OCT and complete exam

## 2023-11-14 NOTE — PROGRESS NOTES
Mariama Baltazar is a 82year old male. HPI:     HPI    Pt is here for an IOP check. He is taking Latanoprost in the morning in both eyes. He did not take any drops this morning. Pt denies any vision changes. Consult:per     Last edited by Master Parker OLUPE on 11/14/2023 11:25 AM.        Patient History:  Past Medical History:   Diagnosis Date    Arthritis     Essential hypertension     Glaucoma 2018    patient seeing [facility] for the [facility] time today, patient is taking Latanoprost OU QHS started by Dr. Acosta Hidden about 5 years ago    Hyperlipidemia     Visual impairment        Surgical History: Marimaa Baltazar has a past surgical history that includes Cataract extraction w/  intraocular lens implant (Right, 05/02/2017) Ptasy Kendrick MD) and Cataract extraction w/  intraocular lens implant (Left, 02/05/2013) Patsy Kendrick MD). Family History   Problem Relation Age of Onset    Diabetes Neg     Glaucoma Neg     Macular degeneration Neg        Social History:   Social History     Socioeconomic History    Marital status:    Tobacco Use    Smoking status: Never     Passive exposure: Never    Smokeless tobacco: Never   Vaping Use    Vaping Use: Never used   Substance and Sexual Activity    Alcohol use: Yes     Comment: 1- 2 times per week     Drug use: Never   Other Topics Concern     Service No    Blood Transfusions No    Caffeine Concern No    Occupational Exposure No    Hobby Hazards No    Sleep Concern No    Stress Concern No    Weight Concern No    Special Diet No    Back Care No    Exercise No    Bike Helmet No    Seat Belt No    Self-Exams No       Medications:  Current Outpatient Medications   Medication Sig Dispense Refill    Benazepril-hydroCHLOROthiazide 20-12.5 MG Oral Tab Take 1 tablet by mouth daily. 90 tablet 0    ibuprofen 800 MG Oral Tab Take 1 tablet (800 mg total) by mouth every 8 (eight) hours as needed for Pain.  90 tablet 0    furosemide 40 MG Oral Tab Take 1 tablet (40 mg total) by mouth daily. potassium chloride 10 MEQ Oral Tab CR Take 1 tablet (10 mEq total) by mouth 2 (two) times daily with meals. AMLODIPINE BESY-BENAZEPRIL HCL 10-20 MG Oral Cap Take 1 capsule by mouth once daily 90 capsule 0    LATANOPROST 0.005 % Ophthalmic Solution INSTILL 1 DROP INTO EACH EYE ONCE DAILY AT NIGHT 7.5 mL 3    LORazepam 0.5 MG Oral Tab Take 1 tablet (0.5 mg total) by mouth nightly. 30 tablet 0    Dutasteride 0.5 MG Oral Cap Take 1 capsule (0.5 mg total) by mouth daily. 90 capsule 0    melatonin 5 MG Oral Cap Take 1 capsule (5 mg total) by mouth nightly. Sildenafil Citrate 100 MG Oral Tab Take 1 tablet (100 mg total) by mouth daily as needed for Erectile Dysfunction. tiZANidine HCl 2 MG Oral Tab Take 1 tablet (2 mg total) by mouth 3 (three) times daily. Pravastatin Sodium 40 MG Oral Tab Take 1 tablet (40 mg total) by mouth nightly.  90 tablet 0       Allergies:  No Known Allergies    ROS:     ROS    Positive for: Eyes  Negative for: Constitutional, Gastrointestinal, Neurological, Skin, Genitourinary, Musculoskeletal, HENT, Endocrine, Cardiovascular, Respiratory, Psychiatric, Allergic/Imm, Heme/Lymph  Last edited by Asim Montero OLUPE on 11/14/2023 11:25 AM.          PHYSICAL EXAM:     Base Eye Exam       Visual Acuity (Snellen - Linear)         Right Left    Dist sc 20/40 -2 20/25 +1    Dist ph sc 20/25 -2               Tonometry (Applanation, 11:37 AM)         Right Left    Pressure 20 19              Pachymetry (3/23/2023)         Right Left    Thickness 530/ +1 544/ +0              Pupils         Pupils    Right PERRL    Left PERRL                  Slit Lamp and Fundus Exam       Slit Lamp Exam         Right Left    Lids/Lashes Dermatochalasis, Meibomian gland dysfunction Dermatochalasis, Meibomian gland dysfunction    Conjunctiva/Sclera Ocular Melanosis, no bleb Ocular Melanosis, no bleb    Cornea no Krukenberg's spindle no Krukenberg's spindle    Anterior Chamber Deep and quiet Deep and quiet    Iris No transillumination defects No transillumination defects    Lens PC IOL PC IOL              Fundus Exam         Right Left    Disc Good rim Good rim    C/D Ratio 0.8 0.65                     ASSESSMENT/PLAN:     Diagnoses and Plan:     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 Continue taking Latanoprost in both eyes every morning.      Will see patient in 4 months for a visual field, OCT and complete exam    Orders Placed This Encounter   Procedures    Pinon Visual Field - OU - Both Eyes    OCT, Optic Nerve - OU - Both Eyes       Meds This Visit:  Requested Prescriptions      No prescriptions requested or ordered in this encounter        Follow up instructions:  Return in about 4 months (around 3/14/2024) for Visual Field, OCT, complete exam.    11/14/2023  Scribed by: Donny Linn MD

## 2023-11-14 NOTE — TELEPHONE ENCOUNTER
Patient's son called with questions on how to use the hat for stool collection. Process was explained to son and he verbalized understanding. Son states he also needs to get a urine sample and staff only gave him 1 cup. Son is returning to office to  another sample cup.

## 2023-11-14 NOTE — TELEPHONE ENCOUNTER
Please review as interaction warning shows with    Disp Refills Start End    AMLODIPINE BESY-BENAZEPRIL HCL 10-20 MG              Refill passed per [employer], [facility] protocol    Requested Prescriptions   Pending Prescriptions Disp Refills    BENAZEPRIL-HYDROCHLOROTHIAZIDE 20-12.5 MG Oral Tab [Pharmacy Med Name: Benazepril-hydroCHLOROthiazide 20-12.5 MG Oral Tablet] 90 tablet 0     Sig: Take 1 tablet by mouth once daily       Hypertensive Medications Protocol Passed - 11/13/2023 10:07 AM        Passed - In person appointment in the past 12 or next 3 months     Recent Outpatient Visits              2 weeks ago At risk for foot problem    [address], [address], [place], [place]    Office Visit    1 month ago Chronic bilateral low back pain, unspecified whether sciatica present    [address] [place], [place]    Office Visit    4 months ago 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, moderate stage    [facility], [address], Pallavi Belter MD    Office Visit    7 months ago Glaucoma suspect of both eyes    [address], [address], [place]    Nurse Only    8 months ago Glaucoma suspect of both eyes    [address], [address], Michael Cooper MD    Office Visit          Future Appointments         Provider Department Appt Notes    Today Maikel Doe MD [address], [address], [place] CONF/EP/ IOP check    In 2 weeks Ashley [place], DO [address], [address], [place] Rfd by Dr Mccann/ Lumbar foraminal stenosis/[employer]    In 2 weeks Kati Boucher DPM [facility], [address] [facility] At risk for foot problem  appt booked by son    In 10 months Dolkristie Mar, DO [address], [address], [place] foll up               [address] BP reading less than 140/90     BP Readings from Last 1 Encounters:   10/26/23 122/62               Passed - CMP or BMP in past 6 months     Recent Results (from the past 4392 hour(s))   COMP METABOLIC PANEL [16853] [Q]    Collection Time: 10/26/23  3:17 PM   Result Value Ref Range    GLUCOSE 83 65 - 99 mg/dL     Comment:               Fasting reference interval         UREA NITROGEN (BUN) 10 7 - 25 mg/dL    CREATININE 0.71 0.70 - 1.22 mg/dL    EGFR 89 > OR = 60 mL/min/1.73m2    BUN/CREATININE RATIO SEE NOTE: 6 - 22 (calc)     Comment:    Not Reported: BUN and Creatinine are within     reference range. SODIUM 142 135 - 146 mmol/L    POTASSIUM 3.7 3.5 - 5.3 mmol/L    CHLORIDE 102 98 - 110 mmol/L    CARBON DIOXIDE 31 20 - 32 mmol/L    CALCIUM 9.7 8.6 - 10.3 mg/dL    PROTEIN, TOTAL 6.5 6.1 - 8.1 g/dL    ALBUMIN 4.0 3.6 - 5.1 g/dL    GLOBULIN 2.5 1.9 - 3.7 g/dL (calc)    ALBUMIN/GLOBULIN RATIO 1.6 1.0 - 2.5 (calc)    BILIRUBIN, TOTAL 0.7 0.2 - 1.2 mg/dL    ALKALINE PHOSPHATASE 64 35 - 144 U/L    AST 20 10 - 35 U/L    ALT 14 9 - 46 U/L     *Note: Due to a large number of results and/or encounters for the requested time period, some results have not been displayed. A complete set of results can be found in Results Review.                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, moderate stage    [address], [address] [facility] [place]    Office Visit    7 months ago Glaucoma suspect of both eyes    [address], [address] [facility], [place]    Nurse Only    8 months ago Glaucoma suspect of both eyes    [address] [facility], Giulaino Beltre MD    Office Visit          Future Appointments         Provider Department Appt Notes    [place] [address] [address], [address], [address] CONF/EP/ IOP check    [place] [address] [address], [address], [place] Rfd by Dr Mccann/ Lumbar foraminal stenosis/[employer]    In 2 weeks SUHAIL WalshM [facility], [address] [facility] At risk for foot problem  appt booked by son    In [place] [address] [address], [address], [facility] follw up               [address] [place] [address] or GFRNAA > 50     GFR Evaluation  EGFRCR: 89 , resulted on 10/26/2023          Passed - EGFRCR or GFRAA > 50     GFR Evaluation  EGFRCR: 89 , resulted on 10/26/2023               Future Appointments         Provider Department Appt Notes    [place] [address] [address], [address], [address] CONF/EP/ IOP check    In [place] [address] [address], [address], [place] Rfd by Dr Mccann/ Lumbar foraminal stenosis/[employer]    In 2 weeks Carlos Webb DPM [facility], [address] [facility] At risk for foot problem  appt booked by son    [place] [address] [address], [address], [facility] follw up            Recent Outpatient Visits              2 weeks ago At risk for foot problem    [address], [address], [place], [place]    Office Visit    1 month ago Chronic bilateral low back pain, unspecified whether sciatica present    [address] [facility], [place], [place]    Office Visit    4 months ago 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

## 2023-11-14 NOTE — TELEPHONE ENCOUNTER
SON called again, (see Maggie's note below ). Son will be going back to the office to get another cup for the stool  test .     OFFICE STAFF=please assists, please also instruct the son on how to properly get  the sample. Thanks.

## 2023-11-14 NOTE — ASSESSMENT & PLAN NOTE
IOP is stable. Continue taking Latanoprost in both eyes every morning.      Will see patient in 4 months for a visual field, OCT and complete exam

## 2023-11-28 ENCOUNTER — OFFICE VISIT (OUTPATIENT)
Dept: PHYSICAL MEDICINE AND REHAB | Facility: CLINIC | Age: 88
End: 2023-11-28
Payer: MEDICARE

## 2023-11-28 DIAGNOSIS — M48.061 SPINAL STENOSIS OF LUMBAR REGION WITHOUT NEUROGENIC CLAUDICATION: ICD-10-CM

## 2023-11-28 DIAGNOSIS — M47.816 LUMBAR FACET ARTHROPATHY: Primary | ICD-10-CM

## 2023-11-28 PROCEDURE — 99204 OFFICE O/P NEW MOD 45 MIN: CPT | Performed by: PHYSICAL MEDICINE & REHABILITATION

## 2023-11-28 PROCEDURE — 1160F RVW MEDS BY RX/DR IN RCRD: CPT | Performed by: PHYSICAL MEDICINE & REHABILITATION

## 2023-11-28 PROCEDURE — 1159F MED LIST DOCD IN RCRD: CPT | Performed by: PHYSICAL MEDICINE & REHABILITATION

## 2023-11-28 RX ORDER — MELOXICAM 15 MG/1
15 TABLET ORAL DAILY
Qty: 14 TABLET | Refills: 0 | Status: SHIPPED | OUTPATIENT
Start: 2023-11-28 | End: 2023-12-12

## 2023-11-28 NOTE — PROGRESS NOTES
[address] [facility]  NEW PATIENT EVALUATION    Consultation as a request of Dr. Dash Howell:     Chief Complaint   Patient presents with    Low Back Pain     NEW RIGHT handed patient ref by Dr. Mac Ro for lumbar foraminal stenosis. Pt reports pain primarily to b/l lower back/buttocks region w/ occasional radiation down b/l thighs. Current pain 6/10 intermittent sharp/shooting pain, denies N/T. Takes ibuprofen w/ some relief. XR lumbar sp on 10/13/23. Pain aggravated by movements and at night. The patient is a 82year old male with significant past medical history of hypertension, glaucoma, hyperlipidemia, visual impairment who presents with low back pain. Pain has been ongoing for the last several months. He denies any injury or trauma. States the pain is in the lower back and it is worse when he tries to stand up from a seated position. He states the pain radiates in the thighs up to the knees. He denies any further radiating symptoms in the legs. He denies any numbness or tingling. Does report some weakness in the legs. Has a very difficult time getting out of the car. Noticing some recent history of urine incontinence with urgency with less control. Denies any saddle anesthesia. States he has noted about a 10 pound weight loss over the last 3 months. He does report some nighttime pain as well. He is taking ibuprofen as tolerated for the pain. He has had x-ray imaging of the lumbar spine as noted below    PHYSICAL EXAM:   There were no vitals taken for this visit. Gait  Able to toe walk and heel walk without any difficulty    LUMBAR SPINE:  Inspection: no erythema, swelling, or obvious deformity. Their iliac crest and shoulder heights are symmetrical.     Palpation: Non tender to palpation of the spinous process.  TTP of bilateral lumbar paraspinal muscles, nontender SI joint  ROM: Restricted in all planes but more pain with extension  Strength: 5/5 in bilateral lower extremities  Sensation: Intact to light touch in all dermatomes of the lower extremities  Reflexes: 3/4 at L4 and S1  Facet Loading: Positive bilateral lower lumbar facet joints  Straight leg raise: negative for radicular pain symptoms  Slump test: negative for pain symptoms for radicular pain symptoms      IMAGING:     Xray lumbar spine completed on 10/13/2023 was personally reviewed which is notable for multilevel facet arthropathy with degenerative changes throughout the lumbar spine and significant to space narrowing at L5-S1. 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 ASSESSMENT/PLAN:     1. Lumbar facet arthropathy    2. Spinal stenosis of lumbar region without neurogenic claudication        Malika Pichardo is a pleasant 53-year-old male presenting today for evaluation of low back pain ongoing for last 2 months. Pain is localized in the axial spine but he does have some weakness subjective in bilateral legs. Given his recent weight loss and nighttime symptoms I would like to obtain MRI imaging of the lumbar spine to rule out any metastatic disease. I believe most the pain is from lumbar facet arthropathy as noted on his x-ray imaging as well. He will likely benefit from interventional lumbosacral spine procedure including facet joint injection after MRI imaging is completed. I recommend that he start Mobic daily for the next 10 days. We discussed side effects and advised him to discontinue it if he has any adverse events. I recommended starting a PT program with home exercises as well which was ordered for him today. The patient verbalized understanding with the plan and was in agreement. All questions/concerns were addressed and there were no barriers to learning.   Please note Dragon dictation software was used to dictate this note and may result in inadvertent typos. rAin Isaacs DO, FAAPMR & CAQSM  Physical Medicine and Rehabilitation  Sports and Spine Medicine    PAST MEDICAL HISTORY:     Past Medical History:   Diagnosis Date    Arthritis     Essential hypertension     Glaucoma 2018    patient seeing [facility] for the [facility] today, patient is taking Latanoprost OU QHS started by Dr. Wesley Aschoff about 5 years ago    Hyperlipidemia     Visual impairment          PAST SURGICAL HISTORY:     Past Surgical History:   Procedure Laterality Date    CATARACT EXTRACTION W/  INTRAOCULAR LENS IMPLANT Right 05/02/2017    Ceasr Pitts MD    CATARACT EXTRACTION W/  INTRAOCULAR LENS IMPLANT Left 02/05/2013    Cesar Pitts MD         CURRENT MEDICATIONS:     Current Outpatient Medications   Medication Sig Dispense Refill    Benazepril-hydroCHLOROthiazide 20-12.5 MG Oral Tab Take 1 tablet by mouth daily. 90 tablet 3    ibuprofen 800 MG Oral Tab Take 1 tablet (800 mg total) by mouth every 8 (eight) hours as needed for Pain. 90 tablet 0    furosemide 40 MG Oral Tab Take 1 tablet (40 mg total) by mouth daily. potassium chloride 10 MEQ Oral Tab CR Take 1 tablet (10 mEq total) by mouth 2 (two) times daily with meals. AMLODIPINE BESY-BENAZEPRIL HCL 10-20 MG Oral Cap Take 1 capsule by mouth once daily 90 capsule 0    LATANOPROST 0.005 % Ophthalmic Solution INSTILL 1 DROP INTO EACH EYE ONCE DAILY AT NIGHT 7.5 mL 3    LORazepam 0.5 MG Oral Tab Take 1 tablet (0.5 mg total) by mouth nightly. 30 tablet 0    Dutasteride 0.5 MG Oral Cap Take 1 capsule (0.5 mg total) by mouth daily. 90 capsule 0    melatonin 5 MG Oral Cap Take 1 capsule (5 mg total) by mouth nightly. tiZANidine HCl 2 MG Oral Tab Take 1 tablet (2 mg total) by mouth 3 (three) times daily. Pravastatin Sodium 40 MG Oral Tab Take 1 tablet (40 mg total) by mouth nightly. 90 tablet 0    Sildenafil Citrate 100 MG Oral Tab Take 1 tablet (100 mg total) by mouth daily as needed for Erectile Dysfunction.  (Patient not taking: Reported on 11/28/2023)           ALLERGIES:   No Known Allergies      FAMILY HISTORY:     Family History   Problem Relation Age of Onset    Diabetes Neg     Glaucoma Neg     Macular degeneration Neg           SOCIAL HISTORY:     Social History     Socioeconomic History    Marital status:     Tobacco Use    Smoking status: Never     Passive exposure: Never    Smokeless tobacco: Never   Vaping Use    Vaping Use: Never used   Substance and Sexual Activity    Alcohol use: Yes     Comment: 1- 2 times per week     Drug use: Never   Other Topics Concern     Service No    Blood Transfusions No    Caffeine Concern No    Occupational Exposure No    Hobby Hazards No    Sleep Concern No    Stress Concern No    Weight Concern No    Special Diet No    Back Care No    Exercise No    Bike Helmet No    Seat Belt No    Self-Exams No          REVIEW OF SYSTEMS:   Patient-reported ROS  Constitutional  Sleep Disturbance: admits (pain)  Chills: denies  Fever: denies  Weight Gain: denies  Weight Loss: admits   Cardiovascular  Chest Pain: denies  Irregular Heartbeat: denies   Respiratory  Painful Breathing: denies  Wheezing: denies   Gastrointestinal  Bowel Incontinence: denies  Heartburn: denies  Abdominal Pain: denies  Blood in Stool : denies  Rectal Pain: denies   Hematology  Easy Bruising: denies  Easy Bleeding: denies   Genitourinary  Difficulty Urinating: admits  Bladder Incontinence: admits (since back issues)  Pelvic Pain: denies  Painful Urination: denies   Musculoskeletal  Joint Stiffness: admits  Painful Joints: admits  Tailbone Pain: admits  Swollen Joints: denies   Peripheral Vascular  Swelling of Legs/Feet: admits  Cold Extremities: denies   Skin  Open Sores: denies  Nodules or Lumps: denies  Rash: denies   Neurological  Loss of Strength Since last Visit: admits  Tingling/Numbness: denies  Balance: admits (admits loss of balance - had two falls about a year ago- no  injury)   Psychiatric  Anxiety: denies  Depressed Mood: denies       PHYSICAL EXAM:   General: No immediate distress  Head: Normocephalic/ Atraumatic  Eyes: Extra-occular movements intact. Ears: No auricular hematoma or deformities  Mouth: No lesions or ulcerations  Heart: peripheral pulses intact. Normal capillary refill.    Lungs: Non-labored respirations  Abdomen: No abdominal guarding  Extremities: No lower extremity edema bilaterally   Skin: No lesions noted   Cognition: alert & oriented x 3, attentive, able to follow 2 step commands, comprehention intact, spontaneous speech intact  Psychiatric: Mood and affect appropriate      LABS:   No results found for: \"EAG\", \"A1C\"  Lab Results   Component Value Date    WBC 8.2 10/26/2023    RBC 3.28 (L) 10/26/2023    HGB 9.4 (L) 10/26/2023    HCT 28.9 (L) 10/26/2023    MCV 88.1 10/26/2023    MCH 28.7 10/26/2023    MCHC 32.5 10/26/2023    RDW 13.8 10/26/2023     10/26/2023     Lab Results   Component Value Date    GLU 83 10/26/2023    BUN 10 10/26/2023    BUNCREA SEE NOTE: 10/26/2023    CREATSERUM 0.71 10/26/2023    ANIONGAP 8 10/18/2021    GFRNAA 92 10/18/2021    GFRAA 106 10/18/2021    CA 9.7 10/26/2023    OSMOCALC 294 10/18/2021    ALKPHO 64 10/26/2023    AST 20 10/26/2023    ALT 14 10/26/2023    BILT 0.7 10/26/2023    TP 6.5 10/26/2023    ALB 4.0 10/26/2023    GLOBULIN 2.5 10/26/2023    AGRATIO 1.6 10/26/2023     10/26/2023    K 3.7 10/26/2023     10/26/2023    CO2 31 10/26/2023     Lab Results   Component Value Date    PTP 13.5 10/18/2021    INR 1.05 10/18/2021     No results found for: \"VITD\", \"QVITD\", \"BZWS89IA\"

## 2023-11-28 NOTE — PATIENT INSTRUCTIONS
Mobic daily for the next 10 days  Start physical therapy and home exercises  MRI of the lumbar spine and follow-up after  Will consider lumbar facet joint injections after MRI

## 2023-12-04 ENCOUNTER — HOSPITAL ENCOUNTER (OUTPATIENT)
Dept: GENERAL RADIOLOGY | Facility: HOSPITAL | Age: 88
Discharge: HOME | End: 2023-12-04
Attending: PODIATRIST
Payer: MEDICARE

## 2023-12-04 ENCOUNTER — OFFICE VISIT (OUTPATIENT)
Dept: PODIATRY CLINIC | Facility: CLINIC | Age: 88
End: 2023-12-04
Payer: MEDICARE

## 2023-12-04 DIAGNOSIS — M79.672 BILATERAL FOOT PAIN: Primary | ICD-10-CM

## 2023-12-04 DIAGNOSIS — M79.672 BILATERAL FOOT PAIN: ICD-10-CM

## 2023-12-04 DIAGNOSIS — M79.671 BILATERAL FOOT PAIN: ICD-10-CM

## 2023-12-04 DIAGNOSIS — M79.671 BILATERAL FOOT PAIN: Primary | ICD-10-CM

## 2023-12-04 DIAGNOSIS — R26.81 GAIT INSTABILITY: ICD-10-CM

## 2023-12-04 DIAGNOSIS — I73.9 PERIPHERAL VASCULAR DISEASE (HCC): ICD-10-CM

## 2023-12-04 PROCEDURE — 1159F MED LIST DOCD IN RCRD: CPT | Performed by: PODIATRIST

## 2023-12-04 PROCEDURE — 73630 X-RAY EXAM OF FOOT: CPT | Performed by: PODIATRIST

## 2023-12-04 PROCEDURE — 99203 OFFICE O/P NEW LOW 30 MIN: CPT | Performed by: PODIATRIST

## 2023-12-04 NOTE — PROGRESS NOTES
[address]  Progress Note    Arvin Esparza is a 82year old male. Chief Complaint   Patient presents with    Foot Pain     Consult foot pain 8/10 worse when he walks. Onset 4 months ago. No injury. HPI:     This is a pleasant male with glaucoma. He does have PMH of lumbar facet arthropathy and spinal stenosis. He does use a cane for walking. He presents to clinic today due to bilateral foot pain which started about 4 months ago. He denies any injury. He states the foot pain is present all day. He has not tried any treatments. Allergies: Patient has no known allergies. Current Outpatient Medications   Medication Sig Dispense Refill    Meloxicam (MOBIC) 15 MG Oral Tab Take 1 tablet (15 mg total) by mouth daily for 14 days. 14 tablet 0    Benazepril-hydroCHLOROthiazide 20-12.5 MG Oral Tab Take 1 tablet by mouth daily. 90 tablet 3    furosemide 40 MG Oral Tab Take 1 tablet (40 mg total) by mouth daily. potassium chloride 10 MEQ Oral Tab CR Take 1 tablet (10 mEq total) by mouth 2 (two) times daily with meals. AMLODIPINE BESY-BENAZEPRIL HCL 10-20 MG Oral Cap Take 1 capsule by mouth once daily 90 capsule 0    LATANOPROST 0.005 % Ophthalmic Solution INSTILL 1 DROP INTO EACH EYE ONCE DAILY AT NIGHT 7.5 mL 3    LORazepam 0.5 MG Oral Tab Take 1 tablet (0.5 mg total) by mouth nightly. 30 tablet 0    Dutasteride 0.5 MG Oral Cap Take 1 capsule (0.5 mg total) by mouth daily. 90 capsule 0    melatonin 5 MG Oral Cap Take 1 capsule (5 mg total) by mouth nightly. Sildenafil Citrate 100 MG Oral Tab Take 1 tablet (100 mg total) by mouth daily as needed for Erectile Dysfunction. tiZANidine HCl 2 MG Oral Tab Take 1 tablet (2 mg total) by mouth 3 (three) times daily. Pravastatin Sodium 40 MG Oral Tab Take 1 tablet (40 mg total) by mouth nightly. 90 tablet 0    ibuprofen 800 MG Oral Tab Take 1 tablet (800 mg total) by mouth every 8 (eight) hours as needed for Pain.  (Patient not taking: Reported on 12/4/2023) 90 tablet 0      Past Medical History:   Diagnosis Date    Arthritis     Essential hypertension     Glaucoma 2018    patient seeing [facility] today, patient is taking Latanoprost OU QHS started by Dr. Socorro Schuster about 5 years ago    Hyperlipidemia     Visual impairment       Past Surgical History:   Procedure Laterality Date    CATARACT EXTRACTION W/  INTRAOCULAR LENS IMPLANT Right 05/02/2017    Sima Olivia MD    CATARACT EXTRACTION W/  INTRAOCULAR LENS IMPLANT Left 02/05/2013    Sima Olivia MD      Family History   Problem Relation Age of Onset    Diabetes Neg     Glaucoma Neg     Macular degeneration Neg       Social History     Socioeconomic History    Marital status:    Tobacco Use    Smoking status: Never     Passive exposure: Never    Smokeless tobacco: Never   Vaping Use    Vaping Use: Never used   Substance and Sexual Activity    Alcohol use: Yes     Comment: 1- 2 times per week     Drug use: Never   Other Topics Concern     Service No    Blood Transfusions No    Caffeine Concern No    Occupational Exposure No    Hobby Hazards No    Sleep Concern No    Stress Concern No    Weight Concern No    Special Diet No    Back Care No    Exercise No    Bike Helmet No    Seat Belt No    Self-Exams No           REVIEW OF SYSTEMS:   Denies nausea, fever, chills  No calf pain  No other muscle or joint aches  Denies chest pain or shortness of breath. EXAM:   There were no vitals taken for this visit. Constitutional:   Patient in no apparent distress. Well kept. Of normal body habitus. Alert and oriented to person, place, and time.   Vascular Examination:  DP pulse is NP  PT pulse is NP  Capillary refill is adequate  Edema is present bilateral ankles   Temperature warm proximally to warm distally bilateral  Integumentary Examination:   The patient's nails appear incurvated, thickened, elongated, dystrophic, discolored with subungual debris 1-5 right, 1-5  left nails. Digital hair growth is absent  Skin is of diminished texture and decreased turgor. Neurological Examination:  Monofilament (10-g) sensation is 5/5 to right and 5/5 to left. Sharp/dull is present to right and is present to left. Parasthesias absent. Musculoskeletal Examination:  Muscle Strength is 4/5. Bunions Deformity present  bilateral.  Hammer digit deformity present digits 2-5  bilateral.    No significant POP to b/l feet      LABS & IMAGING:     Lab Results   Component Value Date    GLU 83 10/26/2023    BUN 10 10/26/2023    CREATSERUM 0.71 10/26/2023    BUNCREA SEE NOTE: 10/26/2023    ANIONGAP 8 10/18/2021    GFRAA 106 10/18/2021    GFRNAA 92 10/18/2021    CA 9.7 10/26/2023     10/26/2023    K 3.7 10/26/2023     10/26/2023    CO2 31 10/26/2023    OSMOCALC 294 10/18/2021        No results found for: \"EAG\", \"A1C\"     No results found. ASSESSMENT AND PLAN:   Diagnoses and all orders for this visit:    Bilateral foot pain  -     US ARTERIAL DUPLEX LOWER EXTREMITY BILATERAL (CPT=93925); Future    Peripheral vascular disease (HCC)  -     US ARTERIAL DUPLEX LOWER EXTREMITY BILATERAL (CPT=93925); Future    Gait instability        Plan:     Discussed the importance of supportive shoe gear and limited barefoot walking. Discussed conservative management and potential for formal PT. Discussed possible oral steroids if patient is not diabetic, otherwise use of NSAIDS if no history of GERD or stomach upset. Discussed the importance of rest and the need for immobilization. Discussed with pt that his foot pain could be due to his lower back issues  Also discussed with pt that his foot could be due to PAD     Ordered b/l foot full WB xrays  Ordered b/l LE arterial US    Pt does see Dr. Caro Gong for his lower back issues. He is considering facet joint injection after his MRI. He was ordered PT. He will start mobic today.         RTC 6 weeks will review xrays and arterial US.  Will also see what Dr. Aristides Vera has recommended. No follow-ups on file.     Nazia Duenas DPM  12/4/2023

## 2024-01-10 DIAGNOSIS — E78.5 HYPERLIPIDEMIA, UNSPECIFIED HYPERLIPIDEMIA TYPE: ICD-10-CM

## 2024-01-10 DIAGNOSIS — I10 ESSENTIAL HYPERTENSION: ICD-10-CM

## 2024-01-11 RX ORDER — POTASSIUM CHLORIDE 750 MG/1
10 TABLET, EXTENDED RELEASE ORAL 2 TIMES DAILY WITH MEALS
Qty: 180 TABLET | Refills: 3 | Status: SHIPPED | OUTPATIENT
Start: 2024-01-11

## 2024-01-11 RX ORDER — SILDENAFIL 100 MG/1
100 TABLET, FILM COATED ORAL
Qty: 24 TABLET | Refills: 3 | Status: SHIPPED | OUTPATIENT
Start: 2024-01-11

## 2024-01-11 RX ORDER — TIZANIDINE 2 MG/1
2 TABLET ORAL 3 TIMES DAILY
Qty: 270 TABLET | Refills: 1 | Status: SHIPPED | OUTPATIENT
Start: 2024-01-11

## 2024-01-11 RX ORDER — AMLODIPINE BESYLATE AND BENAZEPRIL HYDROCHLORIDE 10; 20 MG/1; MG/1
1 CAPSULE ORAL DAILY
Qty: 90 CAPSULE | Refills: 3 | Status: SHIPPED | OUTPATIENT
Start: 2024-01-11

## 2024-01-11 RX ORDER — PRAVASTATIN SODIUM 40 MG
40 TABLET ORAL NIGHTLY
Qty: 90 TABLET | Refills: 3 | Status: SHIPPED | OUTPATIENT
Start: 2024-01-11

## 2024-01-11 RX ORDER — IBUPROFEN 800 MG/1
800 TABLET ORAL EVERY 8 HOURS PRN
Qty: 270 TABLET | Refills: 1 | Status: SHIPPED | OUTPATIENT
Start: 2024-01-11

## 2024-01-11 NOTE — TELEPHONE ENCOUNTER
Please review. Protocol failed or has no protocol. Sildenafil, tizanidine and potassium are externally reported. Amlodipine besy-benazepril hcl is contraindicated with benazepril-hydrochlorothiazide 20-12.5 mg.    Requested Prescriptions   Pending Prescriptions Disp Refills    Sildenafil Citrate 100 MG Oral Tab  0     Sig: Take 1 tablet (100 mg total) by mouth daily as needed for Erectile Dysfunction.       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 MRI RM2 (3T WIDE) [facility] MRI     In 1 week Hasmukh Webb DPM [facility] 6 WKS F/U    In 3 months Moi Lucas MD [facility] EP/ VF OCT and EE    In 3 months Austin Mccann DO [facility] follw up                 tiZANidine 2 MG Oral Tab 270 tablet 1     Sig: Take 1 tablet  (2 mg total) by mouth 3 (three) times daily.       There is no refill protocol information for this order       amLODIPine Besy-Benazepril HCl 10-20 MG Oral Cap 90 capsule 3     Sig: Take 1 capsule by mouth daily.       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, moderate stage    [facility] Moi Lucas MD    Office Visit    2 months ago At risk for foot problem    [facility] Austin Mccann DO    Office Visit    3 months ago Chronic bilateral low back pain, unspecified whether sciatica present    [facility] Austin Mccann,     Office Visit          Future Appointments         Provider Department Appt Notes    In 1 week [facility] MRI RM2 (3T WIDE) [facility] MRI     In 1 week [facility] 6 [facility]/U    In 3 months Moi Lucas MD [facility] EP/ VF OCT and EE    In 3 months Austin Mccann DO [facility] follw up               Passed - Last BP reading less than 140/90     BP Readings from Last 1 Encounters:   10/26/23 122/62               Passed - CMP or BMP in past 6 months     Recent Results (from the past 4392 hour(s))   COMP METABOLIC PANEL [10490] [Q]    Collection Time: 10/26/23  3:17 PM   Result Value Ref Range    GLUCOSE 83 65 - 99 mg/dL     Comment:                Fasting reference interval         UREA NITROGEN (BUN) 10 7 - 25 mg/dL    CREATININE 0.71 0.70 - 1.22 mg/dL    EGFR 89 > OR = 60 mL/min/1.73m2    BUN/CREATININE RATIO SEE NOTE: 6 - 22 (calc)     Comment:    Not Reported: BUN and Creatinine are within     reference range.            SODIUM 142 135 - 146 mmol/L    POTASSIUM 3.7 3.5 - 5.3 mmol/L    CHLORIDE 102 98 - 110 mmol/L    CARBON DIOXIDE 31 20 - 32 mmol/L    CALCIUM 9.7 8.6 - 10.3 mg/dL    PROTEIN, TOTAL 6.5 6.1 - 8.1 g/dL    ALBUMIN 4.0 3.6 - 5.1 g/dL    GLOBULIN 2.5 1.9 - 3.7 g/dL (calc)    ALBUMIN/GLOBULIN RATIO 1.6 1.0 - 2.5 (calc)    BILIRUBIN, TOTAL 0.7 0.2 - 1.2 mg/dL    ALKALINE PHOSPHATASE 64 35 - 144 U/L    AST 20 10 - 35 U/L    ALT 14 9 - 46 U/L     *Note: Due to a large number of results and/or encounters for the requested time period, some results have not been displayed. A complete set of results can be found in Results Review.               Passed - In person appointment or virtual visit in the past 6 months     Recent Outpatient Visits              1 month ago Bilateral foot pain    [facility] Hasmukh Webb DPM    Office Visit    1 month ago Lumbar facet arthropathy    [facility] Koko Cohn,     Office Visit    1 month ago 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, moderate stage    [facility] Moi Lucas MD    Office Visit    2 months ago At risk for foot problem    [facility] Austin Mccann DO    Office Visit    3 months ago Chronic bilateral low back pain, unspecified whether sciatica present    [facility] Austin Mccann,     Office Visit          Future Appointments         Provider Department Appt Notes    In 1 week [facility] MRI RM2 (3T WIDE) [facility] MRI     In 1 week [facility] DPM  [facility] 6 WKS F/U    In 3 months [facility]F [facility] EE    In 3 months Austin Mccann DO [facility] follw up               Passed - EGFRCR or GFRNAA > 50     GFR Evaluation  EGFRCR: 89 , resulted on 10/26/2023          Passed - EGFRCR or GFRAA > 50     GFR Evaluation  EGFRCR: 89 , resulted on 10/26/2023            potassium chloride 10 MEQ Oral Tab CR  0     Sig: Take 1 tablet (10 mEq total) by mouth 2 (two) times daily with meals.       There is no refill protocol information for this order      Signed Prescriptions Disp Refills    pravastatin 40 MG Oral Tab 90 tablet 3     Sig: Take 1 tablet (40 mg total) by mouth nightly.       Cholesterol Medication Protocol Passed - 1/10/2024 10:43 PM        Passed - ALT in past 12 months        Passed - LDL in past 12 months        Passed - Last ALT < 80     Lab Results   Component Value Date    ALT 14 10/26/2023             Passed - Last LDL < 130     Lab Results   Component Value Date    LDL 81 10/26/2023             Passed - In person appointment or virtual visit in the past 12 mos or appointment in next 3 mos     Recent Outpatient Visits              1 month ago Bilateral foot pain    [facility]    Office Visit    1 month ago Lumbar facet arthropathy    [facility] Koko Cohn DO    Office Visit    1 month ago 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, moderate stage    [facility]    Office Visit    2 months ago At risk for foot problem    [facility] Austin Mccann DO    Office Visit    3 months ago Chronic bilateral low back pain, unspecified whether sciatica present    [facility]  [facility] Austin Mccann DO    Office Visit          Future Appointments         Provider Department Appt Notes    In 1 week [facility] MRI RM2 (3T WIDE) [facility] MRI     In 1 week [facility]    In 3 months [facility] MD [facility] EP[facility]F OCT and EE    In 3 months Austin Mccann DO [facility] follw up                 ibuprofen 800 MG Oral Tab 270 tablet 1     Sig: Take 1 tablet (800 mg total) by mouth every 8 (eight) hours as needed for Pain.       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, moderate stage    [facility] Moi Lucas MD    Office Visit    2 months ago At risk for foot problem    [facility] Austin Mccann DO    Office Visit    3 months ago Chronic bilateral low back pain, unspecified whether sciatica present    [facility] Austin Mccann,     Office Visit          Future Appointments         Provider Department Appt Notes    In 1 week [facility] MRI RM2 (3T WIDE) [facility] MRI     In 1 week Hasmukh Webb DPM [facility] 6 WKS F/U    In 3 months [facility] OCT  and EE    In 3 months Austin Mccann DO [facility]                    Recent Outpatient Visits              1 month ago Bilateral foot pain    [facility]    Office Visit    1 month ago Lumbar facet arthropathy    [facility]     Office Visit    1 month ago 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, moderate stage    [facility]    Office Visit    2 months ago At risk for foot problem    [facility] Austin Mccann DO    Office Visit    3 months ago Chronic bilateral low back pain, unspecified whether sciatica present    [facility] Austin Mccann DO    Office Visit            Future Appointments         Provider Department Appt Notes    In 1 week [facility] MRI RM2 (3T WIDE) [facility] MRI     In 1 week [facility]U    In 3 months [facility] MD [facility] OCT and EE    In 3 months Austin Mccann DO [facility]

## 2024-01-11 NOTE — TELEPHONE ENCOUNTER
Refill passed per [facility] protocol.   Requested Prescriptions   Pending Prescriptions Disp Refills    Sildenafil Citrate 100 MG Oral Tab  0     Sig: Take 1 tablet (100 mg total) by mouth daily as needed for Erectile Dysfunction.       Genitourinary Medications Passed - 1/10/2024 10:43 PM        Passed - Patient does not have pulmonary hypertension on problem list        Passed - In person appointment or virtual visit in the past 12 mos or appointment in next 3 mos     Recent Outpatient Visits              1 month ago Bilateral foot pain    [facility] Hasmukh Webb DPM    Office Visit    1 month ago Lumbar facet arthropathy    [facility] Koko Cohn,     Office Visit    1 month ago 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, moderate stage    [facility] Moi Lucas MD    Office Visit    2 months ago At risk for foot problem    [facility] Austin Mccann DO    Office Visit    3 months ago Chronic bilateral low back pain, unspecified whether sciatica present    [facility] Austin Mccann,     Office Visit          Future Appointments         Provider Department Appt Notes    In 1 week [facility] MRI RM2 (3T WIDE) [facility] MRI     In 1 week [facility]U    In 3 months Moi Lucas MD [facility] EP/ VF OCT and EE    In 3 months Austin Mccann DO [facility] follw up                 tiZANidine 2 MG Oral Tab  0     Sig: Take 1 tablet (2 mg total) by mouth 3 (three) times daily.       There is no refill protocol information for this order       pravastatin 40 MG Oral Tab 90 tablet 0     Sig: Take 1 tablet (40  mg total) by mouth nightly.       Cholesterol Medication Protocol Passed - 1/10/2024 10:43 PM        Passed - ALT in past 12 months        Passed - LDL in past 12 months        Passed - Last ALT < 80     Lab Results   Component Value Date    ALT 14 10/26/2023             Passed - Last LDL < 130     Lab Results   Component Value Date    LDL 81 10/26/2023             Passed - In person appointment or virtual visit in the past 12 mos or appointment in next 3 mos     Recent Outpatient Visits              1 month ago Bilateral foot pain    [facility]    Office Visit    1 month ago Lumbar facet arthropathy    [facility] Koko Cohn,     Office Visit    1 month ago 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, moderate stage    [facility] Moi Lucas MD    Office Visit    2 months ago At risk for foot problem    [facility] Austin Mccann DO    Office Visit    3 months ago Chronic bilateral low back pain, unspecified whether sciatica present    [facility] Austin Mccann,     Office Visit          Future Appointments         Provider Department Appt Notes    In 1 week [facility] MRI RM2 (3T WIDE) [facility] MRI     In 1 week [facility] D[facility] F[facility]U    In 3 months Moi Lucas MD [facility] EP/ VF OCT and EE    In 3 months Austin Mccann DO [facility] follw up                 amLODIPine Besy-Benazepril HCl 10-20 MG Oral Cap 90 capsule 0     Sig: Take 1 capsule by mouth daily.       Hypertensive Medications Protocol Passed - 1/10/2024 10:43 PM        Passed - In person appointment in the past 12 or next 3 months      Recent Outpatient Visits              1 month ago Bilateral foot pain    [facility] Hasmukh Webb DPM    Office Visit    1 month ago Lumbar facet arthropathy    [facility] Koko Cohn, DO    Office Visit    1 month ago 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, moderate stage    [facility] Moi Lucas MD    Office Visit    2 months ago At risk for foot problem    [facility] Austin Mccann,     Office Visit    3 months ago Chronic bilateral low back pain, unspecified whether sciatica present    [facility] Austin Mccann,     Office Visit          Future Appointments         Provider Department Appt Notes    In 1 week [facility] MRI RM2 (3T WIDE) [facility] MRI     In 1 week [facility]    In 3 months Moi Lucas MD [facility] EP/ VF OCT and EE    In 3 months Austin Mccann DO [facility] follw up               Passed - Last BP reading less than 140/90     BP Readings from Last 1 Encounters:   10/26/23 122/62               Passed - CMP or BMP in past 6 months     Recent Results (from the past 4392 hour(s))   COMP METABOLIC PANEL [13856] [Q]    Collection Time: 10/26/23  3:17 PM   Result Value Ref Range    GLUCOSE 83 65 - 99 mg/dL     Comment:               Fasting reference interval         UREA NITROGEN (BUN) 10 7 - 25 mg/dL    CREATININE 0.71 0.70 - 1.22 mg/dL    EGFR 89 > OR = 60 mL/min/1.73m2    BUN/CREATININE RATIO SEE NOTE: 6 - 22 (calc)     Comment:    Not Reported: BUN and Creatinine are within     reference range.            SODIUM 142 135 - 146 mmol/L    POTASSIUM 3.7 3.5 - 5.3 mmol/L    CHLORIDE 102  98 - 110 mmol/L    CARBON DIOXIDE 31 20 - 32 mmol/L    CALCIUM 9.7 8.6 - 10.3 mg/dL    PROTEIN, TOTAL 6.5 6.1 - 8.1 g/dL    ALBUMIN 4.0 3.6 - 5.1 g/dL    GLOBULIN 2.5 1.9 - 3.7 g/dL (calc)    ALBUMIN/GLOBULIN RATIO 1.6 1.0 - 2.5 (calc)    BILIRUBIN, TOTAL 0.7 0.2 - 1.2 mg/dL    ALKALINE PHOSPHATASE 64 35 - 144 U/L    AST 20 10 - 35 U/L    ALT 14 9 - 46 U/L     *Note: Due to a large number of results and/or encounters for the requested time period, some results have not been displayed. A complete set of results can be found in Results Review.               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, moderate stage    [facility] Moi Lucas MD    Office Visit    2 months ago At risk for foot problem    [facility] Austin Mccann DO    Office Visit    3 months ago Chronic bilateral low back pain, unspecified whether sciatica present    [facility] Austin Mccann,     Office Visit          Future Appointments         Provider Department Appt Notes    In 1 week [facility] MRI RM2 (3T WIDE) [facility] MRI     In 1 week Hasmukh Webb, DPM [facility] 6 WKS F/U    In 3 months Moi Lucas MD [facility] EP/ VF OCT and EE    In 3 months Austin Mccann DO [facility] follw up               Passed - EGFRCR or GFRNAA > 50     GFR Evaluation  EGFRCR: 89 , resulted on  10/26/2023          Passed - EGFRCR or GFRAA > 50     GFR Evaluation  EGFRCR: 89 , resulted on 10/26/2023            potassium chloride 10 MEQ Oral Tab CR  0     Sig: Take 1 tablet (10 mEq total) by mouth 2 (two) times daily with meals.       There is no refill protocol information for this order       ibuprofen 800 MG Oral Tab 90 tablet 0     Sig: Take 1 tablet (800 mg total) by mouth every 8 (eight) hours as needed for Pain.       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, moderate stage    [facility] Moi Lucas MD    Office Visit    2 months ago At risk for foot problem    [facility] Austin Mccann DO    Office Visit    3 months ago Chronic bilateral low back pain, unspecified whether sciatica present    [facility] Austin Mccann DO    Office Visit          Future Appointments         Provider Department Appt Notes    In 1 week [facility] MRI RM2 (3T WIDE) [facility] MRI     In 1 week Hasmukh Webb DPM [facility] 6 WKS F/U    In 3 months [facility]    In 3 months Austin Mccann DO [facility] follw up                     Recent Outpatient Visits              1 month ago Bilateral foot pain    [facility]  [facility] Hasmukh Webb DPM    Office Visit    1 month ago Lumbar facet arthropathy    [facility] Koko Cohn DO    Office Visit    1 month ago 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 Austin Mccann DO    Office Visit    3 months ago Chronic bilateral low back pain, unspecified whether sciatica present    [facility] Austin Mccann,     Office Visit            Future Appointments         Provider Department Appt Notes    In 1 week [facility] MRI RM2 (3T WIDE) [facility] MRI     In 1 week Hasmukh Webb DPM [facility] 6 WKS F/U    In 3 months Moi Lucas MD [facility] EP/ VF OCT and EE    In 3 months Austin Mccann DO [facility] follw up

## 2024-01-12 RX ORDER — LATANOPROST 50 UG/ML
1 SOLUTION/ DROPS OPHTHALMIC EVERY MORNING
Qty: 7.5 ML | Refills: 3 | Status: SHIPPED | OUTPATIENT
Start: 2024-01-12

## 2024-01-18 ENCOUNTER — TELEPHONE (OUTPATIENT)
Dept: FAMILY MEDICINE CLINIC | Facility: CLINIC | Age: 89
End: 2024-01-18

## 2024-01-18 DIAGNOSIS — H40.9 GLAUCOMA OF BOTH EYES, UNSPECIFIED GLAUCOMA TYPE: Primary | ICD-10-CM

## 2024-01-18 NOTE — TELEPHONE ENCOUNTER
Patient is in need of a referral for Dr. AILYN Lucas in Ophthalmology per insurance requirements.

## 2024-01-22 ENCOUNTER — OFFICE VISIT (OUTPATIENT)
Dept: PODIATRY CLINIC | Facility: CLINIC | Age: 89
End: 2024-01-22
Payer: MEDICARE

## 2024-01-22 ENCOUNTER — HOSPITAL ENCOUNTER (OUTPATIENT)
Dept: MRI IMAGING | Facility: HOSPITAL | Age: 89
Discharge: HOME | End: 2024-01-22
Attending: PHYSICAL MEDICINE & REHABILITATION
Payer: MEDICARE

## 2024-01-22 DIAGNOSIS — M48.061 SPINAL STENOSIS OF LUMBAR REGION WITHOUT NEUROGENIC CLAUDICATION: ICD-10-CM

## 2024-01-22 DIAGNOSIS — M79.671 BILATERAL FOOT PAIN: Primary | ICD-10-CM

## 2024-01-22 DIAGNOSIS — M47.816 LUMBAR FACET ARTHROPATHY: ICD-10-CM

## 2024-01-22 DIAGNOSIS — I73.9 PERIPHERAL VASCULAR DISEASE (HCC): ICD-10-CM

## 2024-01-22 DIAGNOSIS — R26.81 GAIT INSTABILITY: ICD-10-CM

## 2024-01-22 DIAGNOSIS — M79.672 BILATERAL FOOT PAIN: Primary | ICD-10-CM

## 2024-01-22 PROCEDURE — 72148 MRI LUMBAR SPINE W/O DYE: CPT | Performed by: PHYSICAL MEDICINE & REHABILITATION

## 2024-01-22 PROCEDURE — 99213 OFFICE O/P EST LOW 20 MIN: CPT | Performed by: PODIATRIST

## 2024-01-22 PROCEDURE — 1159F MED LIST DOCD IN RCRD: CPT | Performed by: PODIATRIST

## 2024-01-22 NOTE — PROGRESS NOTES
[facility] Podiatry  Progress Note    Jaylan Ibanez is a 88 year old male.   Chief Complaint   Patient presents with    Foot Pain     B/l foot f/u - Rates pain 10/10 - pt here to review xray- pt did not complete US.         HPI:     This is a pleasant male with glaucoma.  He does have PMH of lumbar facet arthropathy and spinal stenosis.  He does use a cane for walking.    He presents to clinic today due to bilateral foot pain f/u.  He denies any injury.  He states the foot pain is present all day.  He is here to go over xray results.  He has not made appt regarding the arterial US.        Allergies: Patient has no known allergies.   Current Outpatient Medications   Medication Sig Dispense Refill    latanoprost 0.005 % Ophthalmic Solution Place 1 drop into both eyes every morning. 7.5 mL 3    Sildenafil Citrate 100 MG Oral Tab Take 1 tablet (100 mg total) by mouth daily as needed for Erectile Dysfunction. 24 tablet 3    tiZANidine 2 MG Oral Tab Take 1 tablet (2 mg total) by mouth 3 (three) times daily. 270 tablet 1    pravastatin 40 MG Oral Tab Take 1 tablet (40 mg total) by mouth nightly. 90 tablet 3    amLODIPine Besy-Benazepril HCl 10-20 MG Oral Cap Take 1 capsule by mouth daily. 90 capsule 3    potassium chloride 10 MEQ Oral Tab CR Take 1 tablet (10 mEq total) by mouth 2 (two) times daily with meals. 180 tablet 3    ibuprofen 800 MG Oral Tab Take 1 tablet (800 mg total) by mouth every 8 (eight) hours as needed for Pain. 270 tablet 1    Benazepril-hydroCHLOROthiazide 20-12.5 MG Oral Tab Take 1 tablet by mouth daily. 90 tablet 3    furosemide 40 MG Oral Tab Take 1 tablet (40 mg total) by mouth daily.      LORazepam 0.5 MG Oral Tab Take 1 tablet (0.5 mg total) by mouth nightly. 30 tablet 0    Dutasteride 0.5 MG Oral Cap Take 1 capsule (0.5 mg total) by mouth daily. 90 capsule 0    melatonin 5 MG Oral Cap Take 1 capsule (5 mg total) by mouth nightly.        Past Medical History:   Diagnosis Date    Arthritis      Essential hypertension     Glaucoma 2018    patient seeing [facility] for the first time today, patient is taking Latanoprost OU QHS started by Dr. Gallagher about 5 years ago    Hyperlipidemia     Visual impairment       Past Surgical History:   Procedure Laterality Date    CATARACT EXTRACTION W/  INTRAOCULAR LENS IMPLANT Right 05/02/2017    Moi Gallagher MD    CATARACT EXTRACTION W/  INTRAOCULAR LENS IMPLANT Left 02/05/2013    Moi Gallagher MD      Family History   Problem Relation Age of Onset    Diabetes Neg     Glaucoma Neg     Macular degeneration Neg       Social History     Socioeconomic History    Marital status:    Tobacco Use    Smoking status: Never     Passive exposure: Never    Smokeless tobacco: Never   Vaping Use    Vaping Use: Never used   Substance and Sexual Activity    Alcohol use: Yes     Comment: 1- 2 times per week     Drug use: Never   Other Topics Concern     Service No    Blood Transfusions No    Caffeine Concern No    Occupational Exposure No    Hobby Hazards No    Sleep Concern No    Stress Concern No    Weight Concern No    Special Diet No    Back Care No    Exercise No    Bike Helmet No    Seat Belt No    Self-Exams No           REVIEW OF SYSTEMS:   Denies nausea, fever, chills  No calf pain  No other muscle or joint aches  Denies chest pain or shortness of breath.      EXAM:   There were no vitals taken for this visit.    Constitutional:   Patient in no apparent distress. Well kept. Of normal body habitus. Alert and oriented to person, place, and time.  Vascular Examination:  DP pulse is NP  PT pulse is NP  Capillary refill is adequate  Edema is present bilateral ankles   Temperature warm proximally to warm distally bilateral  Integumentary Examination:   The patient's nails appear incurvated, thickened, elongated, dystrophic, discolored with subungual debris 1-5 right, 1-5  left nails.  Digital hair growth is absent  Skin is of diminished texture and decreased  turgor.  Neurological Examination:  Monofilament (10-g) sensation is 5/5 to right and 5/5 to left.  Sharp/dull is present to right and is present to left.  Parasthesias absent.  Musculoskeletal Examination:  Muscle Strength is 4/5.  Bunions Deformity present  bilateral.  Hammer digit deformity present digits 2-5  bilateral.    No significant POP to b/l feet      LABS & IMAGING:     Lab Results   Component Value Date    GLU 83 10/26/2023    BUN 10 10/26/2023    CREATSERUM 0.71 10/26/2023    BUNCREA SEE NOTE: 10/26/2023    ANIONGAP 8 10/18/2021    GFRAA 106 10/18/2021    GFRNAA 92 10/18/2021    CA 9.7 10/26/2023     10/26/2023    K 3.7 10/26/2023     10/26/2023    CO2 31 10/26/2023    OSMOCALC 294 10/18/2021        No results found for: \"EAG\", \"A1C\"     No results found.     ASSESSMENT AND PLAN:   Diagnoses and all orders for this visit:    Bilateral foot pain    Peripheral vascular disease (HCC)    Gait instability          Plan:     Discussed the importance of supportive shoe gear and limited barefoot walking.  Discussed conservative management and potential for formal PT.  Discussed possible oral steroids if patient is not diabetic, otherwise use of NSAIDS if no history of GERD or stomach upset.  Discussed the importance of rest and the need for immobilization.      Xray foot B/L full WB: 12/4/23  No acute fracture or dislocation.  Osteopenia.  Cortical thickening and sclerosis in the left 2nd metatarsal may indicate old stress fracture.   Mild-to-moderate scattered degenerative joint disease.   Mild diffuse soft tissue swelling of the feet bilaterally.       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   Discussed with pt that his foot pain could be due to his lower back issues  Also discussed with pt that his foot pain could be due to PAD       Ordered b/l LE arterial US, he has not made appt yet but will call    Pt does see Dr. Cohn Physiatry for his  lower back issues.  He is considering facet joint injection after his MRI which was done today.  He was ordered PT.       RTC 1-2 weeks after arterial US and will discuss results.  Will also see what Dr. Cohn has recommended.        No follow-ups on file.    Hasmukh Webb DPM  1/22/24

## 2024-01-23 NOTE — TELEPHONE ENCOUNTER
Please review; protocol failed/ has no protocol    Medication is listed as patient reported.    Requested Prescriptions   Pending Prescriptions Disp Refills    melatonin 5 MG Oral Cap 30 capsule 0     Sig: Take 1 capsule (5 mg total) by mouth nightly.       There is no refill protocol information for this order        Recent Outpatient Visits              Yesterday Bilateral foot [facility]    [facility] Hasmukh Luan, DPM    Office Visit    1 month ago Bilateral foot pain    [facility] Hasmukh Luan, DPM    Office Visit    1 month ago Lumbar facet arthropathy    [facility] Koko Cohn,     Office Visit    2 months ago 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, moderate stage    [facility] Moi Lucas MD    Office Visit    2 months ago At risk for foot problem    [facility] Austin Mccann,     Office Visit          Future Appointments         Provider Department Appt Notes    In 1 week Koko Cohn,  [facility] what is causing the pain.    In 1 week [facility] US RM4 V[facility] Ultrasound     In 2 months Moi Lucas MD [facility] EP/ VF OCT and EE * referral request sent 01/18. ygs    In 3 months [facility]

## 2024-01-30 ENCOUNTER — HOSPITAL ENCOUNTER (OUTPATIENT)
Dept: ULTRASOUND IMAGING | Facility: HOSPITAL | Age: 89
Discharge: HOME | End: 2024-01-30
Attending: PODIATRIST
Payer: MEDICARE

## 2024-01-30 ENCOUNTER — OFFICE VISIT (OUTPATIENT)
Dept: PHYSICAL MEDICINE AND REHAB | Facility: CLINIC | Age: 89
End: 2024-01-30
Payer: MEDICARE

## 2024-01-30 ENCOUNTER — TELEPHONE (OUTPATIENT)
Dept: PHYSICAL MEDICINE AND REHAB | Facility: CLINIC | Age: 89
End: 2024-01-30

## 2024-01-30 VITALS — BODY MASS INDEX: 21.97 KG/M2 | HEIGHT: 67 IN | WEIGHT: 140 LBS

## 2024-01-30 DIAGNOSIS — M47.816 LUMBAR FACET ARTHROPATHY: Primary | ICD-10-CM

## 2024-01-30 DIAGNOSIS — I73.9 PERIPHERAL VASCULAR DISEASE (HCC): ICD-10-CM

## 2024-01-30 DIAGNOSIS — M79.672 BILATERAL FOOT PAIN: ICD-10-CM

## 2024-01-30 DIAGNOSIS — M48.061 SPINAL STENOSIS OF LUMBAR REGION WITHOUT NEUROGENIC CLAUDICATION: ICD-10-CM

## 2024-01-30 DIAGNOSIS — M79.671 BILATERAL FOOT PAIN: ICD-10-CM

## 2024-01-30 PROCEDURE — 1159F MED LIST DOCD IN RCRD: CPT | Performed by: PHYSICAL MEDICINE & REHABILITATION

## 2024-01-30 PROCEDURE — 1160F RVW MEDS BY RX/DR IN RCRD: CPT | Performed by: PHYSICAL MEDICINE & REHABILITATION

## 2024-01-30 PROCEDURE — 93925 LOWER EXTREMITY STUDY: CPT | Performed by: PODIATRIST

## 2024-01-30 PROCEDURE — 3008F BODY MASS INDEX DOCD: CPT | Performed by: PHYSICAL MEDICINE & REHABILITATION

## 2024-01-30 PROCEDURE — 99214 OFFICE O/P EST MOD 30 MIN: CPT | Performed by: PHYSICAL MEDICINE & REHABILITATION

## 2024-01-30 NOTE — TELEPHONE ENCOUNTER
Per [employer] guidelines, only 2 levels are covered either bilaterally or unilaterally per spine region per session    Original order placed for Bilateral L3-4, L4-5, L5-S1 Facet joint injection under fluoroscopy guidance     Initiated authorization for 2 level Bilateral Lumbar Facet joint injection under fluoroscopy guidance CPT 08600-29, 64494x2 dx:M47.816 to be done at [facility] with [employer]  Status: 2 level Bilateral Lumbar Facet joint injection under fluoroscopy guidance Approved valid 1/30/24-4/30/24 Authorization #[id number]  Tracking #[id number]

## 2024-01-31 NOTE — PROGRESS NOTES
[facility]  OFFICE FOLLOW UP EVALUATION      HISTORY OF PRESENT ILLNESS:     Chief Complaint   Patient presents with    Low Back Pain     LOV: 11/28/2023 pt comes in with bilateral low back, buttock and R hip aching/stabbing pain. Denies T/N. Admits weakness. Rates pain 10/10. Takes ibuprofen PRN. Completed MRI of L-spine. Denies PT.        Patient is following up for low back pain.  He had MRI imaging of the lumbar spine which she is here to review.  He states the pain is primarily in the lumbar axial spine worsened with increased activity and ambulation.  He has a difficult time standing up straight.  He does occasionally get pain radiating into the legs however this is not as severe.  He takes ibuprofen as needed for the pain.  Rates it to be 10 out of 10.  He has not had any change in strength or bowel bladder since last visit    PHYSICAL EXAM:   Ht 67\"   Wt 140 lb (63.5 kg)   BMI 21.93 kg/m²     Gait  Able to toe walk and heel walk without any difficulty     LUMBAR SPINE:  Inspection: no erythema, swelling, or obvious deformity.  Their iliac crest and shoulder heights are symmetrical.     Palpation: Non tender to palpation of the spinous process. TTP of bilateral lumbar paraspinal muscles, nontender SI joint  ROM: Restricted in all planes but more pain with extension  Strength: 5/5 in bilateral lower extremities  Sensation: Intact to light touch in all dermatomes of the lower extremities  Reflexes: 3/4 at L4 and S1  Facet Loading: Positive bilateral lower lumbar facet joints  Straight leg raise: negative for radicular pain symptoms  Slump test: negative for pain symptoms for radicular pain symptoms    IMAGING:     MRI lumbar spine completed on 1/22/2024 was personally reviewed which is notable for multilevel degenerative changes with moderate to severe spinal canal stenosis at L2-3 and severe left greater than right foraminal narrowing at this level.  There is  moderate spinal canal narrowing at L3-4 with severe right greater than left foraminal stenosis.  There is lumbar facet hypertrophy in the lower lumbar facet joints bilaterally.    All imaging results were reviewed and discussed with patient.      ASSESSMENT/PLAN:     1. Lumbar facet arthropathy    2. Spinal stenosis of lumbar region without neurogenic claudication        Jaylan Ibanez is a 88 year old male following up for back pain primarily in the axial lumbar spine which I believe is from lumbar facet arthropathy.  I recommended bilateral lower lumbar facet joint injection under nephroscopy guidance under local anesthesia.  If this does not provide significant improvement, we will consider lumbar interlaminar epidural steroid injection.  Recommend follow-up 2 weeks after the facet joint injections.      The patient verbalized understanding with the plan and was in agreement. All questions/concerns were addressed and there were no barriers to learning.  Please note Dragon dictation software was used to dictate this note and may result in inadvertent typos.    Koko Cohn DO, FAAPMR & CAQSM  Physical Medicine and Rehabilitation  Sports and Spine Medicine    PAST MEDICAL HISTORY:     Past Medical History:   Diagnosis Date    Arthritis     Essential hypertension     Glaucoma 2018    patient seeing [facility] for the [facility] time today, patient is taking Latanoprost OU QHS started by Dr. Gallagher about 5 years ago    Hyperlipidemia     Visual impairment          PAST SURGICAL HISTORY:     Past Surgical History:   Procedure Laterality Date    CATARACT EXTRACTION W/  INTRAOCULAR LENS IMPLANT Right 05/02/2017    Moi Gallagher MD    CATARACT EXTRACTION W/  INTRAOCULAR LENS IMPLANT Left 02/05/2013    Moi Gallagher MD         CURRENT MEDICATIONS:     Current Outpatient Medications   Medication Sig Dispense Refill    melatonin 5 MG Oral Cap Take 1 capsule (5 mg total) by mouth nightly. 30 capsule 0    latanoprost 0.005 % Ophthalmic  Solution Place 1 drop into both eyes every morning. 7.5 mL 3    Sildenafil Citrate 100 MG Oral Tab Take 1 tablet (100 mg total) by mouth daily as needed for Erectile Dysfunction. 24 tablet 3    tiZANidine 2 MG Oral Tab Take 1 tablet (2 mg total) by mouth 3 (three) times daily. 270 tablet 1    pravastatin 40 MG Oral Tab Take 1 tablet (40 mg total) by mouth nightly. 90 tablet 3    amLODIPine Besy-Benazepril HCl 10-20 MG Oral Cap Take 1 capsule by mouth daily. 90 capsule 3    potassium chloride 10 MEQ Oral Tab CR Take 1 tablet (10 mEq total) by mouth 2 (two) times daily with meals. 180 tablet 3    ibuprofen 800 MG Oral Tab Take 1 tablet (800 mg total) by mouth every 8 (eight) hours as needed for Pain. 270 tablet 1    Benazepril-hydroCHLOROthiazide 20-12.5 MG Oral Tab Take 1 tablet by mouth daily. 90 tablet 3    furosemide 40 MG Oral Tab Take 1 tablet (40 mg total) by mouth daily.      LORazepam 0.5 MG Oral Tab Take 1 tablet (0.5 mg total) by mouth nightly. 30 tablet 0    Dutasteride 0.5 MG Oral Cap Take 1 capsule (0.5 mg total) by mouth daily. 90 capsule 0         ALLERGIES:   No Known Allergies      FAMILY HISTORY:     Family History   Problem Relation Age of Onset    Diabetes Neg     Glaucoma Neg     Macular degeneration Neg           SOCIAL HISTORY:     Social History     Socioeconomic History    Marital status:    Tobacco Use    Smoking status: Never     Passive exposure: Never    Smokeless tobacco: Never   Vaping Use    Vaping Use: Never used   Substance and Sexual Activity    Alcohol use: Yes     Comment: 1- 2 times per week     Drug use: Never   Other Topics Concern     Service No    Blood Transfusions No    Caffeine Concern No    Occupational Exposure No    Hobby Hazards No    Sleep Concern No    Stress Concern No    Weight Concern No    Special Diet No    Back Care No    Exercise No    Bike Helmet No    Seat Belt No    Self-Exams No          REVIEW OF SYSTEMS:   A comprehensive 10 point review  of systems was completed.  Pertinent positives and negatives noted in the the HPI.      LABS:   No results found for: \"EAG\", \"A1C\"  Lab Results   Component Value Date    WBC 8.2 10/26/2023    RBC 3.28 (L) 10/26/2023    HGB 9.4 (L) 10/26/2023    HCT 28.9 (L) 10/26/2023    MCV 88.1 10/26/2023    MCH 28.7 10/26/2023    MCHC 32.5 10/26/2023    RDW 13.8 10/26/2023     10/26/2023     Lab Results   Component Value Date    GLU 83 10/26/2023    BUN 10 10/26/2023    BUNCREA SEE NOTE: 10/26/2023    CREATSERUM 0.71 10/26/2023    ANIONGAP 8 10/18/2021    GFRNAA 92 10/18/2021    GFRAA 106 10/18/2021    CA 9.7 10/26/2023    OSMOCALC 294 10/18/2021    ALKPHO 64 10/26/2023    AST 20 10/26/2023    ALT 14 10/26/2023    BILT 0.7 10/26/2023    TP 6.5 10/26/2023    ALB 4.0 10/26/2023    GLOBULIN 2.5 10/26/2023    AGRATIO 1.6 10/26/2023     10/26/2023    K 3.7 10/26/2023     10/26/2023    CO2 31 10/26/2023     Lab Results   Component Value Date    PTP 13.5 10/18/2021    INR 1.05 10/18/2021     No results found for: \"VITD\", \"QVITD\", \"[id number]\"

## 2024-02-02 PROBLEM — M54.50 CHRONIC RIGHT-SIDED LOW BACK PAIN WITHOUT SCIATICA: Status: ACTIVE | Noted: 2018-03-27

## 2024-02-02 PROBLEM — G89.29 CHRONIC RIGHT-SIDED LOW BACK PAIN WITHOUT SCIATICA: Status: ACTIVE | Noted: 2018-03-27

## 2024-02-02 PROBLEM — E78.5 HYPERLIPIDEMIA: Status: ACTIVE | Noted: 2024-02-02

## 2024-02-02 PROBLEM — R73.01 IMPAIRED FASTING GLUCOSE: Status: ACTIVE | Noted: 2024-02-02

## 2024-02-02 PROBLEM — N40.0 BPH (BENIGN PROSTATIC HYPERPLASIA): Status: ACTIVE | Noted: 2024-02-02

## 2024-02-02 NOTE — TELEPHONE ENCOUNTER
Patient has been scheduled for Bilateral L4-5 + L5-S1 Facet joint injection under fluoroscopy guidance  on 2/5/2024 at the [facility] with Dr. Cohn.   -Anesthesia type:  Local  -Patient was advised that if he/she does receive the covid vaccine it needs to be at least 2 weeks before or after the injection.  -Medications and allergies reviewed.  -Patient reminded to hold NSAIDs (Ibuprofen, ASA 81, Aleve, Naproxen, Mobic, Diclofenac, Etodolac, Celebrex etc.) for 3 days prior to Lumbar MBB/Facet if BMI is greater than 35. For Cervical injections only hold multivitamins, Vitamin E, Fish Oil, Phentermine/Lomaira for 7 days prior to injection and NSAIDS.   mg to be held for 7 days prior to injections.  -If patient is receiving MAC/IVCS, weight loss oral/injectable medications will need to be held for 7 days prior to injection.  -Patient informed to fast 12 hours prior to procedure with IVCS/MAC.   -If on blood thinner, clearance has been received and approved to hold this medication by provider.   -Patient informed of [facility]'s  policy:  he/she will need a  to and from procedure and must be on site for their entirety of their visit, if their ride is unable to the procedure will be cancelled.   -[facility] is located in the [facility] 1st floor,  may park in the yellow/purple parking lot.  Patient verbalized understanding and agrees with plan.  Scheduled in Epic: Yes  Scheduled in Surgical Case: Yes  Follow up appointment made: NOV: Visit date not found (HMO)

## 2024-02-02 NOTE — TELEPHONE ENCOUNTER
Spoke with patient informed we will reach out so schedule once we have clearance with management and [facility] scheduling this O case.

## 2024-02-20 ENCOUNTER — OFFICE VISIT (OUTPATIENT)
Dept: PHYSICAL MEDICINE AND REHAB | Facility: CLINIC | Age: 89
End: 2024-02-20
Payer: MEDICARE

## 2024-02-20 VITALS
OXYGEN SATURATION: 96 % | BODY MASS INDEX: 21.97 KG/M2 | HEIGHT: 67 IN | RESPIRATION RATE: 18 BRPM | WEIGHT: 140 LBS | HEART RATE: 115 BPM

## 2024-02-20 DIAGNOSIS — M47.816 LUMBAR FACET ARTHROPATHY: Primary | ICD-10-CM

## 2024-02-20 DIAGNOSIS — M48.061 SPINAL STENOSIS OF LUMBAR REGION WITHOUT NEUROGENIC CLAUDICATION: ICD-10-CM

## 2024-02-20 PROCEDURE — 1160F RVW MEDS BY RX/DR IN RCRD: CPT | Performed by: PHYSICAL MEDICINE & REHABILITATION

## 2024-02-20 PROCEDURE — 99213 OFFICE O/P EST LOW 20 MIN: CPT | Performed by: PHYSICAL MEDICINE & REHABILITATION

## 2024-02-20 PROCEDURE — 3008F BODY MASS INDEX DOCD: CPT | Performed by: PHYSICAL MEDICINE & REHABILITATION

## 2024-02-20 PROCEDURE — 1159F MED LIST DOCD IN RCRD: CPT | Performed by: PHYSICAL MEDICINE & REHABILITATION

## 2024-02-20 NOTE — PROGRESS NOTES
[facility]  OFFICE FOLLOW UP EVALUATION      HISTORY OF PRESENT ILLNESS:     Chief Complaint   Patient presents with    Follow - Up     Pt is F/U after Facet joint injection, Pt states that he is feeling better after injection, states that he has pain occasionally, Pt states that he tae Ibuprofen PRN, Pain 5/10 Denies N/T       Patient is following up for low back pain.  He had lumbar facet joint injection which has provided great improvement.  He states the pain is very minimal.  He has slight discomfort but is not taking medication routinely.  He takes ibuprofen occasionally as needed.  Pain is localized in the axial spine.  He denies any radiating symptoms, numbness tingling or weakness.  Overall he is very happy with his progress.    PHYSICAL EXAM:   Pulse 115   Resp 18   Ht 67\"   Wt 140 lb (63.5 kg)   SpO2 96%   BMI 21.93 kg/m²     LUMBAR SPINE:  Inspection: no erythema, swelling, or obvious deformity.  Their iliac crest and shoulder heights are symmetrical.     Palpation: Non tender to palpation of the spinous process. TTP of bilateral lumbar paraspinal muscles, nontender SI joint  ROM: Restricted in all planes but more pain with extension  Strength: 5/5 in bilateral lower extremities  Sensation: Intact to light touch in all dermatomes of the lower extremities  Reflexes: 3/4 at L4 and S1  Facet Loading: Negative bilateral lower lumbar facet joints  Straight leg raise: negative for radicular pain symptoms  Slump test: negative for pain symptoms for radicular pain symptoms    IMAGING:     MRI lumbar spine completed on 1/22/2024 was personally reviewed which is notable for multilevel degenerative changes with moderate to severe spinal canal stenosis at L2-3 and severe left greater than right foraminal narrowing at this level. There is moderate spinal canal narrowing at L3-4 with severe right greater than left foraminal stenosis. There is lumbar facet  hypertrophy in the lower lumbar facet joints bilaterally.     All imaging results were reviewed and discussed with patient.      ASSESSMENT/PLAN:     1. Lumbar facet arthropathy    2. Spinal stenosis of lumbar region without neurogenic claudication        Jaylan Ibanez is a 89 year old male following up for low back pain secondary lumbar facet arthropathy.  He has responded really well to the facet joint injection.  Recommend that he continue home exercises, topical treatment with ice and heat and advised him to take Tylenol instead of ibuprofen as needed for pain.  Will follow-up with me in 3 months in the office.      The patient verbalized understanding with the plan and was in agreement. All questions/concerns were addressed and there were no barriers to learning.  Please note Dragon dictation software was used to dictate this note and may result in inadvertent typos.    Koko Cohn DO, FAAPMR & CAQSM  Physical Medicine and Rehabilitation  Sports and Spine Medicine    PAST MEDICAL HISTORY:     Past Medical History:   Diagnosis Date    Arthritis     Cataract     Depression     Essential hypertension     Glaucoma 2018    patient seeing [facility] for the first time today, patient is taking Latanoprost OU QHS started by Dr. Gallagher about 5 years ago    High blood pressure     High cholesterol     Hyperlipidemia     Visual impairment          PAST SURGICAL HISTORY:     Past Surgical History:   Procedure Laterality Date    CATARACT EXTRACTION W/  INTRAOCULAR LENS IMPLANT Right 05/02/2017    Moi Gallagher MD    CATARACT EXTRACTION W/  INTRAOCULAR LENS IMPLANT Left 02/05/2013    Moi Gallagher MD         CURRENT MEDICATIONS:     Current Outpatient Medications   Medication Sig Dispense Refill    melatonin 5 MG Oral Cap Take 1 capsule (5 mg total) by mouth nightly. 30 capsule 0    latanoprost 0.005 % Ophthalmic Solution Place 1 drop into both eyes every morning. 7.5 mL 3    Sildenafil Citrate 100 MG Oral Tab Take 1 tablet (100  mg total) by mouth daily as needed for Erectile Dysfunction. 24 tablet 3    tiZANidine 2 MG Oral Tab Take 1 tablet (2 mg total) by mouth 3 (three) times daily. 270 tablet 1    pravastatin 40 MG Oral Tab Take 1 tablet (40 mg total) by mouth nightly. 90 tablet 3    amLODIPine Besy-Benazepril HCl 10-20 MG Oral Cap Take 1 capsule by mouth daily. 90 capsule 3    potassium chloride 10 MEQ Oral Tab CR Take 1 tablet (10 mEq total) by mouth 2 (two) times daily with meals. 180 tablet 3    ibuprofen 800 MG Oral Tab Take 1 tablet (800 mg total) by mouth every 8 (eight) hours as needed for Pain. 270 tablet 1    Benazepril-hydroCHLOROthiazide 20-12.5 MG Oral Tab Take 1 tablet by mouth daily. 90 tablet 3    furosemide 40 MG Oral Tab Take 1 tablet (40 mg total) by mouth daily.      LORazepam 0.5 MG Oral Tab Take 1 tablet (0.5 mg total) by mouth nightly. 30 tablet 0    Dutasteride 0.5 MG Oral Cap Take 1 capsule (0.5 mg total) by mouth daily. 90 capsule 0         ALLERGIES:   No Known Allergies      FAMILY HISTORY:     Family History   Problem Relation Age of Onset    Diabetes Neg     Glaucoma Neg     Macular degeneration Neg           SOCIAL HISTORY:     Social History     Socioeconomic History    Marital status:    Tobacco Use    Smoking status: Never     Passive exposure: Never    Smokeless tobacco: Never   Vaping Use    Vaping Use: Never used   Substance and Sexual Activity    Alcohol use: Yes     Comment: 1- 2 times per week     Drug use: Not Currently   Other Topics Concern     Service No    Blood Transfusions No    Caffeine Concern No    Occupational Exposure No    Hobby Hazards No    Sleep Concern No    Stress Concern No    Weight Concern No    Special Diet No    Back Care No    Exercise No    Bike Helmet No    Seat Belt No    Self-Exams No          REVIEW OF SYSTEMS:   A comprehensive 10 point review of systems was completed.  Pertinent positives and negatives noted in the the HPI.      LABS:   No results  found for: \"EAG\", \"A1C\"  Lab Results   Component Value Date    WBC 8.2 10/26/2023    RBC 3.28 (L) 10/26/2023    HGB 9.4 (L) 10/26/2023    HCT 28.9 (L) 10/26/2023    MCV 88.1 10/26/2023    MCH 28.7 10/26/2023    MCHC 32.5 10/26/2023    RDW 13.8 10/26/2023     10/26/2023     Lab Results   Component Value Date    GLU 83 10/26/2023    BUN 10 10/26/2023    BUNCREA SEE NOTE: 10/26/2023    CREATSERUM 0.71 10/26/2023    ANIONGAP 8 10/18/2021    GFRNAA 92 10/18/2021    GFRAA 106 10/18/2021    CA 9.7 10/26/2023    OSMOCALC 294 10/18/2021    ALKPHO 64 10/26/2023    AST 20 10/26/2023    ALT 14 10/26/2023    BILT 0.7 10/26/2023    TP 6.5 10/26/2023    ALB 4.0 10/26/2023    GLOBULIN 2.5 10/26/2023    AGRATIO 1.6 10/26/2023     10/26/2023    K 3.7 10/26/2023     10/26/2023    CO2 31 10/26/2023     Lab Results   Component Value Date    PTP 13.5 10/18/2021    INR 1.05 10/18/2021     No results found for: \"VITD\", \"QVITD\", \"[id number]\"

## 2024-03-07 ENCOUNTER — PATIENT MESSAGE (OUTPATIENT)
Dept: FAMILY MEDICINE CLINIC | Facility: CLINIC | Age: 89
End: 2024-03-07

## 2024-03-29 ENCOUNTER — LAB ENCOUNTER (OUTPATIENT)
Dept: LAB | Age: 89
End: 2024-03-29
Attending: FAMILY MEDICINE
Payer: MEDICARE

## 2024-03-29 ENCOUNTER — OFFICE VISIT (OUTPATIENT)
Dept: FAMILY MEDICINE CLINIC | Facility: CLINIC | Age: 89
End: 2024-03-29
Payer: MEDICARE

## 2024-03-29 VITALS
RESPIRATION RATE: 18 BRPM | WEIGHT: 127 LBS | TEMPERATURE: 98 F | OXYGEN SATURATION: 99 % | BODY MASS INDEX: 20 KG/M2 | DIASTOLIC BLOOD PRESSURE: 70 MMHG | HEART RATE: 95 BPM | SYSTOLIC BLOOD PRESSURE: 130 MMHG

## 2024-03-29 DIAGNOSIS — R41.0 INTERMITTENT CONFUSION: ICD-10-CM

## 2024-03-29 DIAGNOSIS — G47.00 INSOMNIA, UNSPECIFIED TYPE: ICD-10-CM

## 2024-03-29 DIAGNOSIS — D50.9 MICROCYTIC ANEMIA: ICD-10-CM

## 2024-03-29 DIAGNOSIS — R82.90 ABNORMAL URINALYSIS: Primary | ICD-10-CM

## 2024-03-29 DIAGNOSIS — I10 ESSENTIAL HYPERTENSION: ICD-10-CM

## 2024-03-29 LAB
DEPRECATED HBV CORE AB SER IA-ACNC: 21.2 NG/ML
IRON SATN MFR SERPL: 15 %
IRON SERPL-MCNC: 49 UG/DL
TIBC SERPL-MCNC: 334 UG/DL (ref 250–425)
TRANSFERRIN SERPL-MCNC: 224 MG/DL (ref 215–365)

## 2024-03-29 PROCEDURE — 36415 COLL VENOUS BLD VENIPUNCTURE: CPT

## 2024-03-29 PROCEDURE — 3078F DIAST BP <80 MM HG: CPT | Performed by: FAMILY MEDICINE

## 2024-03-29 PROCEDURE — 1126F AMNT PAIN NOTED NONE PRSNT: CPT | Performed by: FAMILY MEDICINE

## 2024-03-29 PROCEDURE — 83540 ASSAY OF IRON: CPT

## 2024-03-29 PROCEDURE — 84466 ASSAY OF TRANSFERRIN: CPT

## 2024-03-29 PROCEDURE — 1160F RVW MEDS BY RX/DR IN RCRD: CPT | Performed by: FAMILY MEDICINE

## 2024-03-29 PROCEDURE — 82728 ASSAY OF FERRITIN: CPT

## 2024-03-29 PROCEDURE — 1159F MED LIST DOCD IN RCRD: CPT | Performed by: FAMILY MEDICINE

## 2024-03-29 PROCEDURE — 3075F SYST BP GE 130 - 139MM HG: CPT | Performed by: FAMILY MEDICINE

## 2024-03-29 PROCEDURE — 99214 OFFICE O/P EST MOD 30 MIN: CPT | Performed by: FAMILY MEDICINE

## 2024-03-29 RX ORDER — LORAZEPAM 0.5 MG/1
0.5 TABLET ORAL EVERY 6 HOURS PRN
Qty: 30 TABLET | Refills: 0 | Status: SHIPPED | OUTPATIENT
Start: 2024-03-29

## 2024-03-29 NOTE — PATIENT INSTRUCTIONS
Medication reviewed and renewed where needed and appropriate.  Comply with medications.  Monitor blood pressures and record at home. Limit salt intake.  Urinalysis to be repeated and reflex to culture if needed.  Iron studies also to be done on today.  Recommend geriatric evaluation.  Consider PT.  Lorazepam 0.5 mg orally nightly.

## 2024-03-31 ENCOUNTER — TELEPHONE (OUTPATIENT)
Dept: FAMILY MEDICINE CLINIC | Facility: CLINIC | Age: 89
End: 2024-03-31

## 2024-03-31 NOTE — TELEPHONE ENCOUNTER
Approved  0  Prior authorization approved Case ID: [id number]      Payer: Human    [phone]    [phone]   PA Case: [id number], Status: Approved, Coverage Starts on: 1/1/2024 12:00:00 AM, Coverage Ends on: 12/31/2024 12:00:00 AM. Questions? Contact [phone].   Approval Details    Authorization number: 0   Authorized from January 1, 2024 to December 31, 2024

## 2024-03-31 NOTE — TELEPHONE ENCOUNTER
Prior [facility]

## 2024-04-01 NOTE — PROGRESS NOTES
Subjective:     Patient ID: Jaylan Ibanez is a 89 year old male.    This patient is an 89 year old AA male accompanied by his son for follow up on hypertensive and hyperlipidemic disorder. Patient denies headaches, SOB, chest pain, visual changes, dizziness, and/or exertional fatigue.     Patient has 1 main meal daily. He makes his own dinner. He complies with his medications.    Son states he is displaying fleeting memory challenges.     Patient needs follow up on anemia status and also urine studies to rule out possible urinary tract infection.         History/Other:   Review of Systems  Current Outpatient Medications   Medication Sig Dispense Refill    LORazepam 0.5 MG Oral Tab Take 1 tablet (0.5 mg total) by mouth every 6 (six) hours as needed for Anxiety. 30 tablet 0    melatonin 5 MG Oral Cap Take 1 capsule (5 mg total) by mouth nightly. 30 capsule 0    latanoprost 0.005 % Ophthalmic Solution Place 1 drop into both eyes every morning. 7.5 mL 3    Sildenafil Citrate 100 MG Oral Tab Take 1 tablet (100 mg total) by mouth daily as needed for Erectile Dysfunction. 24 tablet 3    tiZANidine 2 MG Oral Tab Take 1 tablet (2 mg total) by mouth 3 (three) times daily. 270 tablet 1    pravastatin 40 MG Oral Tab Take 1 tablet (40 mg total) by mouth nightly. 90 tablet 3    amLODIPine Besy-Benazepril HCl 10-20 MG Oral Cap Take 1 capsule by mouth daily. 90 capsule 3    potassium chloride 10 MEQ Oral Tab CR Take 1 tablet (10 mEq total) by mouth 2 (two) times daily with meals. 180 tablet 3    ibuprofen 800 MG Oral Tab Take 1 tablet (800 mg total) by mouth every 8 (eight) hours as needed for Pain. 270 tablet 1    Benazepril-hydroCHLOROthiazide 20-12.5 MG Oral Tab Take 1 tablet by mouth daily. 90 tablet 3    furosemide 40 MG Oral Tab Take 1 tablet (40 mg total) by mouth daily.      LORazepam 0.5 MG Oral Tab Take 1 tablet (0.5 mg total) by mouth nightly. 30 tablet 0    Dutasteride 0.5 MG Oral Cap Take 1 capsule (0.5 mg total) by  mouth daily. 90 capsule 0     Allergies:No Known Allergies    Past Medical History:   Diagnosis Date    Arthritis     Cataract     Depression     Essential hypertension     Glaucoma 2018    patient seeing [facility] for the [facility] time today, patient is taking Latanoprost OU QHS started by Dr. Gallagher about 5 years ago    High blood pressure     High cholesterol     Hyperlipidemia     Visual impairment       Past Surgical History:   Procedure Laterality Date    CATARACT EXTRACTION W/  INTRAOCULAR LENS IMPLANT Right 05/02/2017    Moi Gallagher MD    CATARACT EXTRACTION W/  INTRAOCULAR LENS IMPLANT Left 02/05/2013    Moi Gallagher MD      Family History   Problem Relation Age of Onset    Diabetes Neg     Glaucoma Neg     Macular degeneration Neg       Social History:   Social History     Socioeconomic History    Marital status:    Tobacco Use    Smoking status: Never     Passive exposure: Never    Smokeless tobacco: Never   Vaping Use    Vaping Use: Never used   Substance and Sexual Activity    Alcohol use: Yes     Comment: 1- 2 times per week     Drug use: Not Currently   Other Topics Concern     Service No    Blood Transfusions No    Caffeine Concern No    Occupational Exposure No    Hobby Hazards No    Sleep Concern No    Stress Concern No    Weight Concern No    Special Diet No    Back Care No    Exercise No    Bike Helmet No    Seat Belt No    Self-Exams No        Objective:   Vitals:    03/29/24 0931   BP: 130/70   Pulse:    Resp:    Temp:        Physical Exam  Constitutional:       General: He is not in acute distress.     Appearance: Normal appearance. He is not ill-appearing.   HENT:      Head: Normocephalic and atraumatic.   Neck:      Thyroid: No thyromegaly.   Cardiovascular:      Rate and Rhythm: Normal rate and regular rhythm.      Heart sounds: Murmur heard.      No gallop.   Pulmonary:      Effort: Pulmonary effort is normal. No respiratory distress.      Breath sounds: Normal breath sounds.    Neurological:      General: No focal deficit present.      Mental Status: He is alert.   Psychiatric:         Mood and Affect: Mood normal.         Assessment & Plan:   1. Abnormal urinalysis  Urine sample sent.  - Urinalysis with Culture Reflex; Future    2. Microcytic anemia  Status assessment for anemia cause.  - Ferritin [E]; Future  - Iron And Tibc [E]; Future    3. Insomnia, unspecified type  Prescribed.  - LORazepam 0.5 MG Oral Tab; Take 1 tablet (0.5 mg total) by mouth every 6 (six) hours as needed for Anxiety.  Dispense: 30 tablet; Refill: 0    4. Essential hypertension  To goal by criteria when measured manually.    5. Intermittent confusion  Could be normal for age or demonstration of early dementia.      Orders Placed This Encounter   Procedures    Urinalysis with Culture Reflex    Ferritin [E]    Iron And Tibc [E]       Meds This Visit:  Requested Prescriptions     Signed Prescriptions Disp Refills    LORazepam 0.5 MG Oral Tab 30 tablet 0     Sig: Take 1 tablet (0.5 mg total) by mouth every 6 (six) hours as needed for Anxiety.       Imaging & Referrals:  None     Patient Instructions   Medication reviewed and renewed where needed and appropriate.  Comply with medications.  Monitor blood pressures and record at home. Limit salt intake.  Urinalysis to be repeated and reflex to culture if needed.  Iron studies also to be done on today.  Recommend geriatric evaluation.  Consider PT.  Lorazepam 0.5 mg orally nightly.    Return in about 6 weeks (around 5/10/2024), or if symptoms worsen or fail to improve.

## 2024-04-02 PROCEDURE — 81003 URINALYSIS AUTO W/O SCOPE: CPT | Performed by: FAMILY MEDICINE

## 2024-04-03 ENCOUNTER — LAB ENCOUNTER (OUTPATIENT)
Dept: LAB | Age: 89
End: 2024-04-03
Attending: FAMILY MEDICINE
Payer: MEDICARE

## 2024-04-03 DIAGNOSIS — R82.90 ABNORMAL URINALYSIS: ICD-10-CM

## 2024-04-03 LAB
BILIRUB UR QL: NEGATIVE
CLARITY UR: CLEAR
COLOR UR: YELLOW
GLUCOSE UR-MCNC: NORMAL MG/DL
HGB UR QL STRIP.AUTO: NEGATIVE
KETONES UR-MCNC: NEGATIVE MG/DL
LEUKOCYTE ESTERASE UR QL STRIP.AUTO: NEGATIVE
NITRITE UR QL STRIP.AUTO: NEGATIVE
PH UR: 6 [PH] (ref 5–8)
PROT UR-MCNC: NEGATIVE MG/DL
SP GR UR STRIP: 1.01 (ref 1–1.03)
UROBILINOGEN UR STRIP-ACNC: NORMAL

## 2024-05-10 ENCOUNTER — OFFICE VISIT (OUTPATIENT)
Dept: FAMILY MEDICINE CLINIC | Facility: CLINIC | Age: 89
End: 2024-05-10
Payer: MEDICARE

## 2024-05-10 VITALS
OXYGEN SATURATION: 98 % | SYSTOLIC BLOOD PRESSURE: 130 MMHG | BODY MASS INDEX: 20 KG/M2 | HEART RATE: 103 BPM | WEIGHT: 128 LBS | DIASTOLIC BLOOD PRESSURE: 62 MMHG

## 2024-05-10 DIAGNOSIS — M48.061 LUMBAR FORAMINAL STENOSIS: ICD-10-CM

## 2024-05-10 DIAGNOSIS — I10 ESSENTIAL HYPERTENSION: ICD-10-CM

## 2024-05-10 DIAGNOSIS — R29.898 WEAKNESS OF BOTH LOWER EXTREMITIES: ICD-10-CM

## 2024-05-10 DIAGNOSIS — N39.0 URINARY TRACT INFECTION WITHOUT HEMATURIA, SITE UNSPECIFIED: Primary | ICD-10-CM
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
## 2024-05-10 PROCEDURE — 99214 OFFICE O/P EST MOD 30 MIN: CPT | Performed by: FAMILY MEDICINE

## 2024-05-10 PROCEDURE — 3078F DIAST BP <80 MM HG: CPT | Performed by: FAMILY MEDICINE

## 2024-05-10 PROCEDURE — 1159F MED LIST DOCD IN RCRD: CPT | Performed by: FAMILY MEDICINE

## 2024-05-10 PROCEDURE — 3075F SYST BP GE 130 - 139MM HG: CPT | Performed by: FAMILY MEDICINE

## 2024-05-10 NOTE — PATIENT INSTRUCTIONS
Medication reviewed and renewed where needed and appropriate.  Comply with medications.  Monitor blood pressures and record at home. Limit salt intake.  Increase water intake.
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
## 2024-05-20 ENCOUNTER — OFFICE VISIT (OUTPATIENT)
Dept: PHYSICAL MEDICINE AND REHAB | Facility: CLINIC | Age: 89
End: 2024-05-20

## 2024-05-20 VITALS — HEIGHT: 67 IN | BODY MASS INDEX: 20.09 KG/M2 | WEIGHT: 128 LBS | HEART RATE: 84 BPM | OXYGEN SATURATION: 98 %

## 2024-05-20 DIAGNOSIS — M48.061 SPINAL STENOSIS OF LUMBAR REGION WITHOUT NEUROGENIC CLAUDICATION: ICD-10-CM

## 2024-05-20 DIAGNOSIS — M47.816 LUMBAR FACET ARTHROPATHY: Primary | ICD-10-CM

## 2024-05-20 PROCEDURE — 1160F RVW MEDS BY RX/DR IN RCRD: CPT | Performed by: PHYSICAL MEDICINE & REHABILITATION

## 2024-05-20 PROCEDURE — 99213 OFFICE O/P EST LOW 20 MIN: CPT | Performed by: PHYSICAL MEDICINE & REHABILITATION

## 2024-05-20 PROCEDURE — 1126F AMNT PAIN NOTED NONE PRSNT: CPT | Performed by: PHYSICAL MEDICINE & REHABILITATION

## 2024-05-20 PROCEDURE — 3008F BODY MASS INDEX DOCD: CPT | Performed by: PHYSICAL MEDICINE & REHABILITATION

## 2024-05-20 PROCEDURE — 1159F MED LIST DOCD IN RCRD: CPT | Performed by: PHYSICAL MEDICINE & REHABILITATION

## 2024-05-20 NOTE — PROGRESS NOTES
[facility]  OFFICE FOLLOW UP EVALUATION      HISTORY OF PRESENT ILLNESS:     Chief Complaint   Patient presents with    Follow - Up     LOV 2/20/24. Patient f/u on low back. Pain 0/10. Denies N/T. Takes Tylenol for pain. No tx.        Patient is following up for low back pain.  Is that he states he is still doing well overall.  He denies any pain today.  He is not taking medication.  He denies any radiating symptoms.  Overall he is happy with his progress.    PHYSICAL EXAM:   Pulse 84   Ht 67\"   Wt 128 lb (58.1 kg)   SpO2 98%   BMI 20.05 kg/m²     LUMBAR SPINE:  Inspection: no erythema, swelling, or obvious deformity.  Their iliac crest and shoulder heights are symmetrical.     Palpation: Non tender to palpation of the spinous process. TTP of bilateral lumbar paraspinal muscles, nontender SI joint  ROM: Restricted in all planes but more pain with extension  Strength: 5/5 in bilateral lower extremities  Sensation: Intact to light touch in all dermatomes of the lower extremities  Reflexes: 3/4 at L4 and S1  Facet Loading: Negative bilateral lower lumbar facet joints  Straight leg raise: negative for radicular pain symptoms  Slump test: negative for pain symptoms for radicular pain symptoms    IMAGING:     MRI lumbar spine completed on 1/22/2024 was personally reviewed which is notable for multilevel degenerative changes with moderate to severe spinal canal stenosis at L2-3 and severe left greater than right foraminal narrowing at this level. There is moderate spinal canal narrowing at L3-4 with severe right greater than left foraminal stenosis. There is lumbar facet hypertrophy in the lower lumbar facet joints bilaterally.     All imaging results were reviewed and discussed with patient.      ASSESSMENT/PLAN:     1. Lumbar facet arthropathy    2. Spinal stenosis of lumbar region without neurogenic claudication        Jaylan Ibanez is a 89 year old male following up  for low back pain secondary lumbar facet arthropathy.  I recommended continuing home exercises, topical treatment Tylenol as needed for pain.  Recommend he follow-up as needed in the future.      The patient verbalized understanding with the plan and was in agreement. All questions/concerns were addressed and there were no barriers to learning.  Please note Dragon dictation software was used to dictate this note and may result in inadvertent typos.    Koko Cohn DO, FAAPMR & CAQSM  Physical Medicine and Rehabilitation  Sports and Spine Medicine    PAST MEDICAL HISTORY:     Past Medical History:    Arthritis    Cataract    Depression    Essential hypertension    Glaucoma    patient seeing [facility] for the first time today, patient is taking Latanoprost OU QHS started by Dr. Gallagher about 5 years ago    High blood pressure    High cholesterol    Hyperlipidemia    Visual impairment         PAST SURGICAL HISTORY:     Past Surgical History:   Procedure Laterality Date    Cataract extraction w/  intraocular lens implant Right 05/02/2017    Moi Gallagher MD    Cataract extraction w/  intraocular lens implant Left 02/05/2013    Moi Gallagher MD         CURRENT MEDICATIONS:     Current Outpatient Medications   Medication Sig Dispense Refill    LORazepam 0.5 MG Oral Tab Take 1 tablet (0.5 mg total) by mouth every 6 (six) hours as needed for Anxiety. 30 tablet 0    latanoprost 0.005 % Ophthalmic Solution Place 1 drop into both eyes every morning. 7.5 mL 3    Sildenafil Citrate 100 MG Oral Tab Take 1 tablet (100 mg total) by mouth daily as needed for Erectile Dysfunction. 24 tablet 3    tiZANidine 2 MG Oral Tab Take 1 tablet (2 mg total) by mouth 3 (three) times daily. (Patient not taking: Reported on 5/10/2024) 270 tablet 1    pravastatin 40 MG Oral Tab Take 1 tablet (40 mg total) by mouth nightly. 90 tablet 3    amLODIPine Besy-Benazepril HCl 10-20 MG Oral Cap Take 1 capsule by mouth daily. 90 capsule 3    potassium chloride 10  MEQ Oral Tab CR Take 1 tablet (10 mEq total) by mouth 2 (two) times daily with meals. 180 tablet 3    ibuprofen 800 MG Oral Tab Take 1 tablet (800 mg total) by mouth every 8 (eight) hours as needed for Pain. 270 tablet 1    Benazepril-hydroCHLOROthiazide 20-12.5 MG Oral Tab Take 1 tablet by mouth daily. 90 tablet 3    furosemide 40 MG Oral Tab Take 1 tablet (40 mg total) by mouth daily.      LORazepam 0.5 MG Oral Tab Take 1 tablet (0.5 mg total) by mouth nightly. 30 tablet 0    Dutasteride 0.5 MG Oral Cap Take 1 capsule (0.5 mg total) by mouth daily. 90 capsule 0         ALLERGIES:   No Known Allergies      FAMILY HISTORY:     Family History   Problem Relation Age of Onset    Diabetes Neg     Glaucoma Neg     Macular degeneration Neg           SOCIAL HISTORY:     Social History     Socioeconomic History    Marital status:    Tobacco Use    Smoking status: Never     Passive exposure: Never    Smokeless tobacco: Never   Vaping Use    Vaping status: Never Used   Substance and Sexual Activity    Alcohol use: Yes     Comment: 1- 2 times per week     Drug use: Not Currently   Other Topics Concern     Service No    Blood Transfusions No    Caffeine Concern No    Occupational Exposure No    Hobby Hazards No    Sleep Concern No    Stress Concern No    Weight Concern No    Special Diet No    Back Care No    Exercise No    Bike Helmet No    Seat Belt No    Self-Exams No     Social Determinants of Health      Received from [facility], [facility]    Social Connections    Received from [facility], [facility]    Housing Stability          REVIEW OF SYSTEMS:   A comprehensive 10 point review of systems was completed.  Pertinent positives and negatives noted in the the HPI.      LABS:   No results found for: \"EAG\", \"A1C\"  Lab Results   Component Value Date    WBC 8.2 10/26/2023    RBC 3.28 (L) 10/26/2023    HGB 9.4 (L) 10/26/2023    HCT  28.9 (L) 10/26/2023    MCV 88.1 10/26/2023    MCH 28.7 10/26/2023    MCHC 32.5 10/26/2023    RDW 13.8 10/26/2023     10/26/2023     Lab Results   Component Value Date    GLU 83 10/26/2023    BUN 10 10/26/2023    BUNCREA SEE NOTE: 10/26/2023    CREATSERUM 0.71 10/26/2023    ANIONGAP 8 10/18/2021    GFRNAA 92 10/18/2021    GFRAA 106 10/18/2021    CA 9.7 10/26/2023    OSMOCALC 294 10/18/2021    ALKPHO 64 10/26/2023    AST 20 10/26/2023    ALT 14 10/26/2023    BILT 0.7 10/26/2023    TP 6.5 10/26/2023    ALB 4.0 10/26/2023    GLOBULIN 2.5 10/26/2023    AGRATIO 1.6 10/26/2023     10/26/2023    K 3.7 10/26/2023     10/26/2023    CO2 31 10/26/2023     Lab Results   Component Value Date    PTP 13.5 10/18/2021    INR 1.05 10/18/2021     No results found for: \"VITD\", \"QVITD\", \"DMXM31VX\"

## 2024-07-08 ENCOUNTER — NURSE TRIAGE (OUTPATIENT)
Dept: FAMILY MEDICINE CLINIC | Facility: CLINIC | Age: 89
End: 2024-07-08

## 2024-07-08 NOTE — TELEPHONE ENCOUNTER
Priority is with specialty clinic.  If need be the patient can be a double book for Tuesday, July 9, 2024 at 11:40 AM.  He will be seen until after noon on the same day.

## 2024-07-08 NOTE — TELEPHONE ENCOUNTER
Action Requested: Summary for Provider     []  Critical Lab, Recommendations Needed  [x] Need Additional Advice  []   FYI    []   Need Orders  [] Need Medications Sent to Pharmacy  []  Other     SUMMARY: Spoke with patient's  son per [facility]O, Date of Birth verified. He stated he just spoke with patient and complaining of back pain, pain all over his body.   Patient takes Tylenol as needed but not helping  He will also try to reach out to Ortho as he got steroid shot for his back months ago with relief.    Patient is negative for chest pain, shortness of breath, no other symptom.   He  was advised if patient symptom persist or gets worse to take him to ER/ IC, he agreed and stated understanding.   He is looking for appt with PCP.  Can we add patient this week?   pls advise, thanks in advance.           Reason for call: back pain  Onset: 3 days             Reason for Disposition   Patient wants to be seen    Protocols used: Back Pain-A-[place]

## 2024-07-08 NOTE — TELEPHONE ENCOUNTER
Son contacted and notified.  Added to schedule per instructions below.  He was also able to schedule with specialist 07/22.

## 2024-07-09 NOTE — PATIENT INSTRUCTIONS
Sublingual zolpidem has been prescribed at 3.5 mg to be taken nightly.  Patient has been instructed to take the lorazepam during the daytime as needed up to twice a day.  There may be days 1 is not needed at all.

## 2024-07-09 NOTE — PROGRESS NOTES
Subjective:     Patient ID: Jaylan Ibanez is a 89 year old male.    This patient is an established 89-year-old hypertensive -American gentleman who has a history for depression/anxiety who is accompanied by his son reporting a worsening sleep hygiene and a diminished appetite and admittedly an increased focus on the loss of loved ones including family and friends.  He describes tangible sensation of heaviness that comes upon him at which point he is unable to fall asleep and/or stay asleep in the rest of his day makes him feel as though he is drained.    There is no daytime naps.  The patient lorazepam and needs a refill.  We have had a thorough discussion and prescription medication for sleep will be sent to pharmacy and he is to continue with his daytime lorazepam.        History/Other:   Review of Systems  Current Outpatient Medications   Medication Sig Dispense Refill    LORazepam 0.5 MG Oral Tab Take 1 tablet (0.5 mg total) by mouth nightly. 30 tablet 0    LORazepam 0.5 MG Oral Tab Take 1 tablet (0.5 mg total) by mouth every 6 (six) hours as needed for Anxiety. 30 tablet 0    latanoprost 0.005 % Ophthalmic Solution Place 1 drop into both eyes every morning. 7.5 mL 3    Sildenafil Citrate 100 MG Oral Tab Take 1 tablet (100 mg total) by mouth daily as needed for Erectile Dysfunction. 24 tablet 3    tiZANidine 2 MG Oral Tab Take 1 tablet (2 mg total) by mouth 3 (three) times daily. (Patient not taking: Reported on 5/10/2024) 270 tablet 1    pravastatin 40 MG Oral Tab Take 1 tablet (40 mg total) by mouth nightly. 90 tablet 3    amLODIPine Besy-Benazepril HCl 10-20 MG Oral Cap Take 1 capsule by mouth daily. 90 capsule 3    potassium chloride 10 MEQ Oral Tab CR Take 1 tablet (10 mEq total) by mouth 2 (two) times daily with meals. 180 tablet 3    ibuprofen 800 MG Oral Tab Take 1 tablet (800 mg total) by mouth every 8 (eight) hours as needed for Pain. 270 tablet 1    Benazepril-hydroCHLOROthiazide 20-12.5 MG  Oral Tab Take 1 tablet by mouth daily. 90 tablet 3    furosemide 40 MG Oral Tab Take 1 tablet (40 mg total) by mouth daily.      Dutasteride 0.5 MG Oral Cap Take 1 capsule (0.5 mg total) by mouth daily. 90 capsule 0     Allergies:No Known Allergies    Past Medical History:    Arthritis    Cataract    Depression    Essential hypertension    Glaucoma    patient seeing [facility] for the first time today, patient is taking Latanoprost OU QHS started by Dr. Gallagher about 5 years ago    High blood pressure    High cholesterol    Hyperlipidemia    Visual impairment      Past Surgical History:   Procedure Laterality Date    Cataract extraction w/  intraocular lens implant Right 05/02/2017    Moi Gallagher MD    Cataract extraction w/  intraocular lens implant Left 02/05/2013    Moi Gallagher MD      Family History   Problem Relation Age of Onset    Diabetes Neg     Glaucoma Neg     Macular degeneration Neg       Social History:   Social History     Socioeconomic History    Marital status:    Tobacco Use    Smoking status: Never     Passive exposure: Never    Smokeless tobacco: Never   Vaping Use    Vaping status: Never Used   Substance and Sexual Activity    Alcohol use: Yes     Comment: 1- 2 times per week     Drug use: Not Currently   Other Topics Concern     Service No    Blood Transfusions No    Caffeine Concern No    Occupational Exposure No    Hobby Hazards No    Sleep Concern No    Stress Concern No    Weight Concern No    Special Diet No    Back Care No    Exercise No    Bike Helmet No    Seat Belt No    Self-Exams No     Social Determinants of Health      [facility]    Social Connections    Received from [facility], [facility]    Housing Stability        Objective:   Vitals:    07/09/24 1213   BP: 133/49   Pulse: 82       Physical Exam  Constitutional:       Appearance: Normal appearance. He is not  ill-appearing.   Cardiovascular:      Rate and Rhythm: Normal rate and regular rhythm.      Heart sounds: Murmur heard.      No gallop.   Pulmonary:      Effort: Pulmonary effort is normal.      Breath sounds: Normal breath sounds.   Neurological:      Mental Status: He is alert and oriented to person, place, and time.   Psychiatric:         Mood and Affect: Mood is depressed. Affect is flat.      Comments: Covered anxiousness.         Assessment & Plan:   1. Psychophysiological insomnia  Medications reviewed and instructed  - LORazepam 0.5 MG Oral Tab; Take 1 tablet (0.5 mg total) by mouth nightly.  Dispense: 30 tablet; Refill: 0  - Zolpidem Tartrate 3.5 MG Sublingual SL Tab; Place 1 tablet under the tongue at bedtime.  Dispense: 30 tablet; Refill: 0    2. Depression, recurrent (HCC)  This is the cause for #1.    3. Essential hypertension  Blood pressure measures to goal.      No orders of the defined types were placed in this encounter.      Meds This Visit:  Requested Prescriptions     Signed Prescriptions Disp Refills    LORazepam 0.5 MG Oral Tab 30 tablet 0     Sig: Take 1 tablet (0.5 mg total) by mouth nightly.       Imaging & Referrals:  None     Patient Instructions   Sublingual zolpidem has been prescribed at 3.5 mg to be taken nightly.  Patient has been instructed to take the lorazepam during the daytime as needed up to twice a day.  There may be days 1 is not needed at all.    Return in about 4 weeks (around 8/6/2024), or if symptoms worsen or fail to improve.

## 2024-07-17 ENCOUNTER — OFFICE VISIT (OUTPATIENT)
Dept: PHYSICAL MEDICINE AND REHAB | Facility: CLINIC | Age: 89
End: 2024-07-17
Payer: MEDICARE

## 2024-07-17 DIAGNOSIS — M47.816 LUMBAR FACET ARTHROPATHY: Primary | ICD-10-CM

## 2024-07-17 PROCEDURE — 1160F RVW MEDS BY RX/DR IN RCRD: CPT | Performed by: PHYSICAL MEDICINE & REHABILITATION

## 2024-07-17 PROCEDURE — 99214 OFFICE O/P EST MOD 30 MIN: CPT | Performed by: PHYSICAL MEDICINE & REHABILITATION

## 2024-07-17 PROCEDURE — 1159F MED LIST DOCD IN RCRD: CPT | Performed by: PHYSICAL MEDICINE & REHABILITATION

## 2024-07-17 NOTE — PROGRESS NOTES
[facility]  OFFICE FOLLOW UP EVALUATION      HISTORY OF PRESENT ILLNESS:     Chief Complaint   Patient presents with    Follow - Up     LOV 5/20/24. Patient f/u on low back. Pain 10/10. Denies N/T. Denies weakness. Takes Ibuprofen for pain with some relief.        Patient is following up for low back pain secondary lumbar facet arthropathy.  He states that since last visit the pain is started to worsen again.  Pain is localized in the axial spine he has a difficult time standing up from a seated position and straightening up when he is standing.  He denies any further radiating symptoms in the legs, any numbness tingling or weakness.  He is taking over-the-counter ibuprofen as needed.  He rates pain to be 10 out of 10.    PHYSICAL EXAM:   There were no vitals taken for this visit.    LUMBAR SPINE:  Inspection: no erythema, swelling, or obvious deformity.  Their iliac crest and shoulder heights are symmetrical.     Palpation: Non tender to palpation of the spinous process. TTP of bilateral lumbar paraspinal muscles, nontender SI joint  ROM: Restricted in all planes but more pain with extension  Strength: 5/5 in bilateral lower extremities  Sensation: Intact to light touch in all dermatomes of the lower extremities  Reflexes: 3/4 at L4 and S1  Facet Loading: Positive bilateral lower lumbar facet joints  Straight leg raise: negative for radicular pain symptoms  Slump test: negative for pain symptoms for radicular pain symptoms       IMAGING:     MRI lumbar spine completed on 1/22/2024 was personally reviewed which is notable for multilevel degenerative changes with moderate to severe spinal canal stenosis at L2-3 and severe left greater than right foraminal narrowing at this level. There is moderate spinal canal narrowing at L3-4 with severe right greater than left foraminal stenosis. There is lumbar facet hypertrophy in the lower lumbar facet joints bilaterally.     All  imaging results were reviewed and discussed with patient.      ASSESSMENT/PLAN:     1. Lumbar facet arthropathy        Jaylan Ibanez is a 89 year old male following up for low back pain with lumbar facet arthropathy.  I recommend bilateral lower lumbar facet joint injections again.  Recommend ice heat and topical lidocaine patch until he can schedule for the procedure.  Recommend follow-up 2 weeks after the procedure      The patient verbalized understanding with the plan and was in agreement. All questions/concerns were addressed and there were no barriers to learning.  Please note Dragon dictation software was used to dictate this note and may result in inadvertent typos.    Koko Cohn DO, FAAPMR & CAQSM  Physical Medicine and Rehabilitation  Sports and Spine Medicine    PAST MEDICAL HISTORY:     Past Medical History:    Arthritis    Cataract    Depression    Essential hypertension    Glaucoma    patient seeing [facility] for the first time today, patient is taking Latanoprost OU QHS started by Dr. Gallagher about 5 years ago    High blood pressure    High cholesterol    Hyperlipidemia    Visual impairment         PAST SURGICAL HISTORY:     Past Surgical History:   Procedure Laterality Date    Cataract extraction w/  intraocular lens implant Right 05/02/2017    Moi Gallagher MD    Cataract extraction w/  intraocular lens implant Left 02/05/2013    Moi Gallagher MD         CURRENT MEDICATIONS:     Current Outpatient Medications   Medication Sig Dispense Refill    LORazepam 0.5 MG Oral Tab Take 1 tablet (0.5 mg total) by mouth nightly. 30 tablet 0    Zolpidem Tartrate 3.5 MG Sublingual SL Tab Place 1 tablet under the tongue at bedtime. 30 tablet 0    LORazepam 0.5 MG Oral Tab Take 1 tablet (0.5 mg total) by mouth every 6 (six) hours as needed for Anxiety. 30 tablet 0    latanoprost 0.005 % Ophthalmic Solution Place 1 drop into both eyes every morning. 7.5 mL 3    Sildenafil Citrate 100 MG Oral Tab Take 1 tablet (100 mg  total) by mouth daily as needed for Erectile Dysfunction. 24 tablet 3    tiZANidine 2 MG Oral Tab Take 1 tablet (2 mg total) by mouth 3 (three) times daily. (Patient not taking: Reported on 5/10/2024) 270 tablet 1    pravastatin 40 MG Oral Tab Take 1 tablet (40 mg total) by mouth nightly. 90 tablet 3    amLODIPine Besy-Benazepril HCl 10-20 MG Oral Cap Take 1 capsule by mouth daily. 90 capsule 3    potassium chloride 10 MEQ Oral Tab CR Take 1 tablet (10 mEq total) by mouth 2 (two) times daily with meals. 180 tablet 3    ibuprofen 800 MG Oral Tab Take 1 tablet (800 mg total) by mouth every 8 (eight) hours as needed for Pain. 270 tablet 1    Benazepril-hydroCHLOROthiazide 20-12.5 MG Oral Tab Take 1 tablet by mouth daily. 90 tablet 3    furosemide 40 MG Oral Tab Take 1 tablet (40 mg total) by mouth daily.      Dutasteride 0.5 MG Oral Cap Take 1 capsule (0.5 mg total) by mouth daily. 90 capsule 0         ALLERGIES:   No Known Allergies      FAMILY HISTORY:     Family History   Problem Relation Age of Onset    Diabetes Neg     Glaucoma Neg     Macular degeneration Neg           SOCIAL HISTORY:     Social History     Socioeconomic History    Marital status:    Tobacco Use    Smoking status: Never     Passive exposure: Never    Smokeless tobacco: Never   Vaping Use    Vaping status: Never Used   Substance and Sexual Activity    Alcohol use: Yes     Comment: 1- 2 times per week     Drug use: Not Currently   Other Topics Concern     Service No    Blood Transfusions No    Caffeine Concern No    Occupational Exposure No    Hobby Hazards No    Sleep Concern No    Stress Concern No    Weight Concern No    Special Diet No    Back Care No    Exercise No    Bike Helmet No    Seat Belt No    Self-Exams No     Social Determinants of Health      Received from [facility], [facility]    Social Connections    Received from [facility], [facility]     Housing Stability          REVIEW OF SYSTEMS:   A comprehensive 10 point review of systems was completed.  Pertinent positives and negatives noted in the the HPI.      LABS:   No results found for: \"EAG\", \"A1C\"  Lab Results   Component Value Date    WBC 8.2 10/26/2023    RBC 3.28 (L) 10/26/2023    HGB 9.4 (L) 10/26/2023    HCT 28.9 (L) 10/26/2023    MCV 88.1 10/26/2023    MCH 28.7 10/26/2023    MCHC 32.5 10/26/2023    RDW 13.8 10/26/2023     10/26/2023     Lab Results   Component Value Date    GLU 83 10/26/2023    BUN 10 10/26/2023    BUNCREA SEE NOTE: 10/26/2023    CREATSERUM 0.71 10/26/2023    ANIONGAP 8 10/18/2021    GFRNAA 92 10/18/2021    GFRAA 106 10/18/2021    CA 9.7 10/26/2023    OSMOCALC 294 10/18/2021    ALKPHO 64 10/26/2023    AST 20 10/26/2023    ALT 14 10/26/2023    BILT 0.7 10/26/2023    TP 6.5 10/26/2023    ALB 4.0 10/26/2023    GLOBULIN 2.5 10/26/2023    AGRATIO 1.6 10/26/2023     10/26/2023    K 3.7 10/26/2023     10/26/2023    CO2 31 10/26/2023     Lab Results   Component Value Date    PTP 13.5 10/18/2021    INR 1.05 10/18/2021     No results found for: \"VITD\", \"QVITD\", \"UXOB55IR\"

## 2024-07-17 NOTE — PATIENT INSTRUCTIONS
-My office will call once injection is approved  -Lidocaine patch as needed  -Ice/Heat as tolerated  -Continue walking and exercise as

## 2024-07-18 ENCOUNTER — TELEPHONE (OUTPATIENT)
Dept: PHYSICAL MEDICINE AND REHAB | Facility: CLINIC | Age: 89
End: 2024-07-18

## 2024-07-18 NOTE — TELEPHONE ENCOUNTER
Initiated Bilateral L4-5 and L5-S1 facet joint injections - local  CPT Code: 63799-36, 64494x2 & Dx: M47.816 to be done at [facility] with site [facility].   Status: Approved  Auth #: [id number]  Tracking ID: [id number]

## 2024-07-30 PROBLEM — H40.1190 PRIMARY OPEN ANGLE GLAUCOMA: Status: ACTIVE | Noted: 2024-07-30

## 2024-07-30 PROBLEM — H40.9 GLAUCOMA: Status: ACTIVE | Noted: 2024-07-30

## 2024-08-05 ENCOUNTER — APPOINTMENT (OUTPATIENT)
Dept: SURGERY | Facility: CLINIC | Age: 89
End: 2024-08-05
Payer: MEDICARE

## 2024-08-28 DIAGNOSIS — F51.04 PSYCHOPHYSIOLOGICAL INSOMNIA: ICD-10-CM

## 2024-08-28 NOTE — TELEPHONE ENCOUNTER
Zolpidem Tartrate 3.5 MG Sublingual SL Tab, Place 1 tablet under the tongue at bedtime., Disp: 30 tablet, Rfl: 0

## 2024-08-29 RX ORDER — ZOLPIDEM TARTRATE 3.5 MG/1
1 TABLET SUBLINGUAL NIGHTLY
Qty: 30 TABLET | Refills: 0 | Status: SHIPPED | OUTPATIENT
Start: 2024-08-29

## 2024-08-29 NOTE — TELEPHONE ENCOUNTER
Please review; protocol failed/ has no protocol      Recent fills: 07/10/2024,03/29/2024  Last Rx written: 07/09/2024  Last Office Visit: 07/09/2024    Recent Visits  Date Type Provider Dept   07/09/24 Office Visit Austin Mccann DO [facility]     Future Appointments  Date Type Provider Dept   09/03/24 Appointment Austin Mccann,  [facility]         Requested Prescriptions   Pending Prescriptions Disp Refills    Zolpidem Tartrate 3.5 MG Sublingual SL Tab 30 tablet 0     Sig: Place 1 tablet under the tongue at bedtime.       Controlled Substance Medication Failed - 8/28/2024  1:55 PM        Failed - This medication is a controlled substance - forward to provider to refill           Recent Outpatient Visits              1 month ago [facility]bar facet [facility]    Office Visit    1 month ago Psychophysiological insomnia    [facility] ([place], [place]) Austin Mccann, DO    Office Visit    3 months ago Lumbar facet arthropathy    [facility]    Office Visit    3 months ago Urinary tract infection without hematuria, site unspecified    [facility] ([place], [place]) Austin Mccann, DO    Office Visit    5 months ago Abnormal urinalysis    [facility] ([place], [place]) Austin Mccann, DO    Office Visit          Future Appointments         Provider Department Appt Notes    In 5 days Austin Mccann,  [facility] ([place], [place]) 6 week follwo up    In 2 months Austin Mccann,  [facility] ([place], [place]) ma px

## 2024-10-28 ENCOUNTER — OFFICE VISIT (OUTPATIENT)
Dept: FAMILY MEDICINE CLINIC | Facility: CLINIC | Age: 89
End: 2024-10-28
Payer: MEDICARE

## 2024-10-28 VITALS
OXYGEN SATURATION: 97 % | HEIGHT: 67 IN | HEART RATE: 83 BPM | SYSTOLIC BLOOD PRESSURE: 110 MMHG | RESPIRATION RATE: 18 BRPM | TEMPERATURE: 98 F | BODY MASS INDEX: 19.62 KG/M2 | DIASTOLIC BLOOD PRESSURE: 60 MMHG | WEIGHT: 125 LBS

## 2024-10-28 DIAGNOSIS — Z00.00 MEDICARE ANNUAL WELLNESS VISIT, SUBSEQUENT: ICD-10-CM

## 2024-10-28 DIAGNOSIS — G47.00 INSOMNIA, UNSPECIFIED TYPE: ICD-10-CM

## 2024-10-28 DIAGNOSIS — E78.5 HYPERLIPIDEMIA, UNSPECIFIED HYPERLIPIDEMIA TYPE: ICD-10-CM

## 2024-10-28 DIAGNOSIS — I10 ESSENTIAL HYPERTENSION: ICD-10-CM

## 2024-10-28 DIAGNOSIS — Z28.21 VARICELLA ZOSTER VIRUS (VZV) VACCINATION DECLINED: ICD-10-CM

## 2024-10-28 DIAGNOSIS — Z28.21 INFLUENZA VACCINATION DECLINED BY PATIENT: Primary | ICD-10-CM

## 2024-10-28 DIAGNOSIS — I73.9 PERIPHERAL ARTERIAL DISEASE (HCC): ICD-10-CM

## 2024-10-28 DIAGNOSIS — H40.9 GLAUCOMA OF BOTH EYES, UNSPECIFIED GLAUCOMA TYPE: ICD-10-CM

## 2024-10-28 LAB
ALBUMIN SERPL-MCNC: 4.2 G/DL (ref 3.2–4.8)
ALBUMIN/GLOB SERPL: 1.9 {RATIO} (ref 1–2)
ALP LIVER SERPL-CCNC: 65 U/L
ALT SERPL-CCNC: 13 U/L
ANION GAP SERPL CALC-SCNC: 5 MMOL/L (ref 0–18)
AST SERPL-CCNC: 19 U/L (ref ?–34)
BASOPHILS # BLD AUTO: 0.06 X10(3) UL (ref 0–0.2)
BASOPHILS NFR BLD AUTO: 0.8 %
BILIRUB SERPL-MCNC: 0.7 MG/DL (ref 0.2–1.1)
BUN BLD-MCNC: 13 MG/DL (ref 9–23)
BUN/CREAT SERPL: 14.4 (ref 10–20)
CALCIUM BLD-MCNC: 9.9 MG/DL (ref 8.7–10.4)
CHLORIDE SERPL-SCNC: 102 MMOL/L (ref 98–112)
CHOLEST SERPL-MCNC: 158 MG/DL (ref ?–200)
CO2 SERPL-SCNC: 34 MMOL/L (ref 21–32)
CREAT BLD-MCNC: 0.9 MG/DL
DEPRECATED RDW RBC AUTO: 50.4 FL (ref 35.1–46.3)
EGFRCR SERPLBLD CKD-EPI 2021: 83 ML/MIN/1.73M2 (ref 60–?)
EOSINOPHIL # BLD AUTO: 0.08 X10(3) UL (ref 0–0.7)
EOSINOPHIL NFR BLD AUTO: 1.1 %
ERYTHROCYTE [DISTWIDTH] IN BLOOD BY AUTOMATED COUNT: 15.1 % (ref 11–15)
FASTING PATIENT LIPID ANSWER: YES
FASTING STATUS PATIENT QL REPORTED: YES
GLOBULIN PLAS-MCNC: 2.2 G/DL (ref 2–3.5)
GLUCOSE BLD-MCNC: 84 MG/DL (ref 70–99)
HCT VFR BLD AUTO: 28.2 %
HDLC SERPL-MCNC: 57 MG/DL (ref 40–59)
HGB BLD-MCNC: 9.2 G/DL
IMM GRANULOCYTES # BLD AUTO: 0.02 X10(3) UL (ref 0–1)
IMM GRANULOCYTES NFR BLD: 0.3 %
LDLC SERPL CALC-MCNC: 87 MG/DL (ref ?–100)
LYMPHOCYTES # BLD AUTO: 3 X10(3) UL (ref 1–4)
LYMPHOCYTES NFR BLD AUTO: 39.8 %
MCH RBC QN AUTO: 29.7 PG (ref 26–34)
MCHC RBC AUTO-ENTMCNC: 32.6 G/DL (ref 31–37)
MCV RBC AUTO: 91 FL
MONOCYTES # BLD AUTO: 0.81 X10(3) UL (ref 0.1–1)
MONOCYTES NFR BLD AUTO: 10.8 %
NEUTROPHILS # BLD AUTO: 3.56 X10 (3) UL (ref 1.5–7.7)
NEUTROPHILS # BLD AUTO: 3.56 X10(3) UL (ref 1.5–7.7)
NEUTROPHILS NFR BLD AUTO: 47.2 %
NONHDLC SERPL-MCNC: 101 MG/DL (ref ?–130)
OSMOLALITY SERPL CALC.SUM OF ELEC: 291 MOSM/KG (ref 275–295)
PLATELET # BLD AUTO: 261 10(3)UL (ref 150–450)
POTASSIUM SERPL-SCNC: 3.2 MMOL/L (ref 3.5–5.1)
PROT SERPL-MCNC: 6.4 G/DL (ref 5.7–8.2)
RBC # BLD AUTO: 3.1 X10(6)UL
SODIUM SERPL-SCNC: 141 MMOL/L (ref 136–145)
TRIGL SERPL-MCNC: 71 MG/DL (ref 30–149)
TSI SER-ACNC: 3.26 MIU/ML (ref 0.55–4.78)
VLDLC SERPL CALC-MCNC: 11 MG/DL (ref 0–30)
WBC # BLD AUTO: 7.5 X10(3) UL (ref 4–11)

## 2024-10-28 PROCEDURE — 80053 COMPREHEN METABOLIC PANEL: CPT | Performed by: FAMILY MEDICINE

## 2024-10-28 PROCEDURE — 84443 ASSAY THYROID STIM HORMONE: CPT | Performed by: FAMILY MEDICINE

## 2024-10-28 PROCEDURE — 80061 LIPID PANEL: CPT | Performed by: FAMILY MEDICINE

## 2024-10-28 PROCEDURE — 85025 COMPLETE CBC W/AUTO DIFF WBC: CPT | Performed by: FAMILY MEDICINE

## 2024-10-28 RX ORDER — LORAZEPAM 0.5 MG/1
0.5 TABLET ORAL EVERY 6 HOURS PRN
Qty: 30 TABLET | Refills: 0 | Status: SHIPPED | OUTPATIENT
Start: 2024-10-28

## 2024-10-28 NOTE — PROGRESS NOTES
Subjective:     Patient ID: Jaylan Ibanez is a 89 year old male.    This patient is a well-established 89-year-old hypertensive/hyperlipidemic -American male here for Medicare annual visit which will also satisfy all the requirements for a complete preventive care physical and for status update on any confirmed chronic medical illnesses and follow up on any previous labs or procedures that were suggestive or in need of further work up. Colonoscopy no longer recommended by criteria. Bowel and bladder functions are intact.    The following information will be communicated to the vascular specialist who will make a further assessment of the patient's documented peroneal arterial decreased blood supply:    Please evaluate this 89 year old with a documented diminished peroneal blood supply in the left lower extremity.  Patient gives a pretty consistent history for intermittent claudication.  Please evaluate and treat as deemed necessary and appropriate.  Additionally, please evaluate the patient's vein insufficiency as he continues to show bilateral lower extremity edema.    Patient declines on seasonal flu vaccine.  Patient also declined on shingles vaccine.      History/Other:   Review of Systems  Current Outpatient Medications   Medication Sig Dispense Refill    LORazepam 0.5 MG Oral Tab Take 1 tablet (0.5 mg total) by mouth every 6 (six) hours as needed for Anxiety. 30 tablet 0    Zolpidem Tartrate 3.5 MG Sublingual SL Tab Place 1 tablet under the tongue at bedtime. 30 tablet 0    LORazepam 0.5 MG Oral Tab Take 1 tablet (0.5 mg total) by mouth nightly. 30 tablet 0    latanoprost 0.005 % Ophthalmic Solution Place 1 drop into both eyes every morning. 7.5 mL 3    Sildenafil Citrate 100 MG Oral Tab Take 1 tablet (100 mg total) by mouth daily as needed for Erectile Dysfunction. 24 tablet 3    pravastatin 40 MG Oral Tab Take 1 tablet (40 mg total) by mouth nightly. 90 tablet 3    amLODIPine Besy-Benazepril HCl 10-20  MG Oral Cap Take 1 capsule by mouth daily. 90 capsule 3    potassium chloride 10 MEQ Oral Tab CR Take 1 tablet (10 mEq total) by mouth 2 (two) times daily with meals. 180 tablet 3    Benazepril-hydroCHLOROthiazide 20-12.5 MG Oral Tab Take 1 tablet by mouth daily. 90 tablet 3    furosemide 40 MG Oral Tab Take 1 tablet (40 mg total) by mouth daily.      Dutasteride 0.5 MG Oral Cap Take 1 capsule (0.5 mg total) by mouth daily. 90 capsule 0    tiZANidine 2 MG Oral Tab Take 1 tablet (2 mg total) by mouth 3 (three) times daily. (Patient not taking: Reported on 10/28/2024) 270 tablet 1    ibuprofen 800 MG Oral Tab Take 1 tablet (800 mg total) by mouth every 8 (eight) hours as needed for Pain. 270 tablet 1     Allergies:Allergies[1]    Past Medical History:    Arthritis    Cataract    Depression    Essential hypertension    Glaucoma    patient seeing [facility] for the first time today, patient is taking Latanoprost OU QHS started by Dr. Gallagher about 5 years ago    High blood pressure    High cholesterol    Hyperlipidemia    Visual impairment      Past Surgical History:   Procedure Laterality Date    Cataract extraction w/  intraocular lens implant Right 05/02/2017    Moi Gallagher MD    Cataract extraction w/  intraocular lens implant Left 02/05/2013    Moi Gallagher MD      Family History   Problem Relation Age of Onset    Diabetes Neg     Glaucoma Neg     Macular degeneration Neg       Social History:   Social History     Socioeconomic History    Marital status:    Tobacco Use    Smoking status: Never     Passive exposure: Never    Smokeless tobacco: Never   Vaping Use    Vaping status: Never Used   Substance and Sexual Activity    Alcohol use: Yes     Comment: 1- 2 times per week     Drug use: Not Currently   Other Topics Concern     Service No    Blood Transfusions No    Caffeine Concern No    Occupational Exposure No    Hobby Hazards No    Sleep Concern No    Stress Concern No    Weight Concern No    Special  Diet No    Back Care No    Exercise No    Bike Helmet No    Seat Belt No    Self-Exams No     Social Drivers of Health      [facility], [facility]    Social Connections    Received from [facility], [facility]    Housing Elizabeth Ibanez's SCREENING SCHEDULE   Tests on this list are recommended by your physician but may not be covered, or covered at this frequency, by your insurer. Please check with your insurance carrier before scheduling to verify coverage.    PREVENTATIVE SERVICES  INDICATIONS AND SCHEDULE Internal Lab or Procedure External Lab or Procedure   Diabetes Screening      HbgA1C   Annually No results found for: \"A1C\"      No data to display                Fasting Blood Sugar (FSB) Annually GLUCOSE (mg/dL)   Date Value   10/26/2023 83       Cardiovascular Disease Screening     LDL Annually LDL-CHOLESTEROL (mg/dL (calc))   Date Value   10/26/2023 81        EKG One Time      Colorectal Cancer Screening      Colonoscopy Screen every 10 years No recommendations at this time Update Health Maintenance if applicable    Flex Sigmoidoscopy Screen every 5 years No results found for this or any previous visit.      No data to display                 Fecal Occult Blood Annually No results found for: \"FOB\", \"OCCULTSTOOL\"      No data to display                Glaucoma Screening      Ophthalmology Visit Annually      Prostate Cancer Screening      PSA  Annually There are no preventive care reminders to display for this patient.  Update Health Maintenance if applicable   Immunizations      Influenza No orders found for this or any previous visit. Update Immunization Activity if applicable    Pneumococcal No orders found for this or any previous visit. Update Immunization Activity if applicable    Hepatitis B No orders found for this or any previous visit. Update Immunization Activity if applicable    Tetanus No  orders found for this or any previous visit. Update Immunization Activity if applicable    Zoster (Not covered by Medicare Part B) No orders found for this or any previous visit. Update Immunization Activity if applicable     SPECIFIC DISEASE MONITORING Internal Lab or Procedure External Lab or Procedure   Annual Monitoring of Persistent     Medications (ACE/ARB, digoxin, diuretics)    Potassium  Annually POTASSIUM (mmol/L)   Date Value   10/26/2023 3.7         No data to display                Creatinine  Annually CREATININE (mg/dL)   Date Value   10/26/2023 0.71         No data to display                Digoxin Serum Conc  Annually No results found for: \"DIGOXIN\"      No data to display                Diabetes      HgbA1C  Annually No results found for: \"A1C\"      No data to display                Creat/alb ratio  Annually      LDL  Annually LDL-CHOLESTEROL (mg/dL (calc))   Date Value   10/26/2023 81         No data to display                 Dilated Eye exam  Annually      No data to display                   No data to display                COPD      Spirometry Testing Annually No results found for this or any previous visit.      No data to display                    General Health     In the past six months, have you lost more than 10 pounds without trying?: (Patient-Rptd) 2 - No    Has your appetite been poor?: (Patient-Rptd) No    Type of Diet: (Patient-Rptd) Other    How does the patient maintain a good energy level?: (Patient-Rptd) Other         How would you describe your current health state?: (Patient-Rptd) Fair    How do you maintain positive mental well-being?: (Patient-Rptd) Social Interaction         Have you had any immunizations at another office such as Influenza, Hepatitis B, Tetanus, or Pneumococcal?: (Patient-Rptd) No     Functional Ability          Toileting: (Patient-Rptd) Able without help    Dressing: (Patient-Rptd) Able without help    Eating: (Patient-Rptd) Able without help    Driving:  (Patient-Rptd) Able without help    Preparing your meals: (Patient-Rptd) Able without help    Managing money/bills: (Patient-Rptd) Able without help    Taking medications as prescribed: (Patient-Rptd) Able without help    Are you able to afford your medications?: (Patient-Rptd) Yes    Hearing Problems?: (Patient-Rptd) No     Functional Status     Hearing Problems?: (Patient-Rptd) No    Vision Problems? : (Patient-Rptd) No    Difficulty walking?: (Patient-Rptd) No    Difficulty dressing or bathing?: (Patient-Rptd) No    Problems with daily activities? : (Patient-Rptd) No    Memory Problems?: (Patient-Rptd) No      Fall/Risk Assessment                                                              Depression Screening (PHQ-2/PHQ-9): Over the LAST 2 WEEKS                 1. Little interest or pleasure in doing things: Several days  2. Feeling down, depressed, or hopeless: Several days  3. Trouble falling or staying asleep, or sleeping too much: Several days  4. Feeling tired or having little energy: Several days  5. Poor appetite or overeating: Not at all  6. Feeling bad about yourself - or that you are a failure or have let yourself or your family down: Not at all  7. Trouble concentrating on things, such as reading the newspaper or watching television: Not at all  8. Moving or speaking so slowly that other people could have noticed. Or the opposite - being so fidgety or restless that you have been moving around a lot more than usual: Not at all  9. Thoughts that you would be better off dead, or of hurting yourself in some way: Not at all  PHQ-9 TOTAL SCORE: 4  If you checked off any problems, how difficult have these problems made it for you to do your work, take care of things at home, or get along with other people?: Not difficult at all      Advance Directives     Do you have a healthcare power of ?: (Patient-Rptd) No    Do you have a living will?: (Patient-Rptd) No     Hearing Assessment (Required for  AWV/SWV)      Hearing Screening    Screening Method: Questionnaire  I have a problem hearing over the telephone: Sometimes I have trouble following the conversations when two or more people are talking at the same time: No   I have trouble understanding things on the TV: Sometimes I have to strain to understand conversations: No   I have to worry about missing the telephone ring or doorbell: Sometimes I have trouble hearing conversations in a noisy background such as a crowded room or restaurant: No   I get confused about where sounds come from: Sometimes I misunderstand some words in a sentence and need to ask people to repeat themselves: Yes   I especially have trouble understanding the speech of women and children: No I have trouble understanding the speaker in a large room such as at a meeting or place of Denominational: No   Many people I talk to seem to mumble (or don't speak clearly): No People get annoyed because I misunderstand what they say: No   I misunderstand what others are saying and make inappropriate responses: No I avoid social activities because I cannot hear well and fear I will reply improperly: No   Family members and friends have told me they think I may have hearing loss: Sometimes             Visual Acuity     Right Eye Visual Acuity: Uncorrected Left Eye Visual Acuity: Uncorrected   Right Eye Chart Acuity: 20/100 Left Eye Chart Acuity: 20/80     Cognitive Assessment     What day of the week is this?: Correct    What month is it?: Correct    What year is it?: Incorrect    Recall \"Ball\": Correct    Recall \"Flag\": Correct    Recall \"Tree\": Correct          Objective:   Vitals:    10/28/24 1119   BP: 107/55   Pulse: 83   Resp: 18   Temp: 98.1 °F (36.7 °C)       Physical Exam  Constitutional:       Appearance: Normal appearance.   HENT:      Head: Normocephalic and atraumatic.   Neck:      Thyroid: No thyromegaly.   Cardiovascular:      Rate and Rhythm: Normal rate and regular rhythm.      Heart  sounds: Murmur heard.      No gallop.   Pulmonary:      Effort: Pulmonary effort is normal.      Breath sounds: Normal breath sounds.   Musculoskeletal:      Right lower le+ Pitting Edema present.      Left lower le+ Pitting Edema present.   Neurological:      Mental Status: He is alert and oriented to person, place, and time.   Psychiatric:         Mood and Affect: Mood normal.       Assessment & Plan:   1. Medicare annual wellness visit, subsequent  Survey completed and the following labs have been ordered.  - CBC W Differential W Platelet [E]; Future  - Comp Metabolic Panel (14) [E]; Future  - Lipid Panel [E]; Future  - TSH [E]; Future  - Urinalysis, Routine [E]; Future    2. Essential hypertension  To goal. Medication compliance encouraged.    3. Hyperlipidemia, unspecified hyperlipidemia type  Status update.    4. Insomnia, unspecified type  Refilled.  - LORazepam 0.5 MG Oral Tab; Take 1 tablet (0.5 mg total) by mouth every 6 (six) hours as needed for Anxiety.  Dispense: 30 tablet; Refill: 0    5. Influenza vaccination declined by patient  Declined.  - High Dose Fluzone trivalent influenza, 65yrs+ PFS (21927)    6. Varicella zoster virus (VZV) vaccination declined  Declined.    7. Peripheral arterial disease (HCC)  Referred.  - Vascular Surgery - Dr. Samer Najjar [facility] [id number]    8. Glaucoma of both eyes, unspecified glaucoma type  Referred.  - Ophthalmology Referral - In Network      Orders Placed This Encounter   Procedures    CBC W Differential W Platelet [E]    Comp Metabolic Panel (14) [E]    Lipid Panel [E]    TSH [E]    Urinalysis, Routine [E]    High Dose Fluzone trivalent influenza, 65yrs+ PFS (49909)       Meds This Visit:  Requested Prescriptions     Signed Prescriptions Disp Refills    LORazepam 0.5 MG Oral Tab 30 tablet 0     Sig: Take 1 tablet (0.5 mg total) by mouth every 6 (six) hours as needed for Anxiety.       Imaging & Referrals:  INFLUENZA VAC HIGH DOSE PRSV FREE  VASCULAR SURGERY  - INTERNAL     Patient Instructions   All adult screening ordered and done appropriate for patient's age and gender and risk factors and complaints.  To vascular specialist.  Medication reviewed and renewed where needed and appropriate.  Comply with medications.  Monitor blood pressures and record at home. Limit salt intake.    Thank you weReturn in about 1 year (around 10/28/2025), or if symptoms worsen or fail to improve.       [1] No Known Allergies

## 2024-10-28 NOTE — PATIENT INSTRUCTIONS
All adult screening ordered and done appropriate for patient's age and gender and risk factors and complaints.  To vascular specialist.  Medication reviewed and renewed where needed and appropriate.  Comply with medications.  Monitor blood pressures and record at home. Limit salt intake.

## 2024-11-12 ENCOUNTER — OFFICE VISIT (OUTPATIENT)
Facility: CLINIC | Age: 89
End: 2024-11-12
Payer: MEDICARE

## 2024-11-12 VITALS — HEIGHT: 67 IN | WEIGHT: 125 LBS | BODY MASS INDEX: 19.62 KG/M2 | RESPIRATION RATE: 16 BRPM

## 2024-11-12 DIAGNOSIS — R60.0 LEG EDEMA, RIGHT: Primary | ICD-10-CM

## 2024-11-12 NOTE — PROGRESS NOTES
Samer F. Najjar, MD  Vascular Surgery  [facility]      VASCULAR SURGERY   CLINIC CONSULT NOTE        Name: Jaylan Ibanez   :   1935  [id number]     REFERRING PHYSICIAN:  Austin Mccann  PRIMARY CARE PHYSICIAN:  Austin Mccann DO    HISTORY OF PRESENT ILLNESS:   Patient is a 89 year old male who has been referred regarding assessment of his right lower extremity edema.  The patient denies any heaviness or tightness of the leg but his son noticed slightly increasing lymphedema along the lateral part of his ankle.  The patient had a venous reflux ultrasound last year that was negative.  He also had an arterial flow test 10 months ago that was essentially normal.    PAST MEDICAL HISTORY:    Past Medical History:    Arthritis    Cataract    Depression    Essential hypertension    Glaucoma    patient seeing [facility] for the first time today, patient is taking Latanoprost OU QHS started by Dr. Gallagher about 5 years ago    High blood pressure    High cholesterol    Hyperlipidemia    Visual impairment       PAST SURGICAL HISTORY:   Past Surgical History:   Procedure Laterality Date    Cataract extraction w/  intraocular lens implant Right 2017    Moi Gallagher MD    Cataract extraction w/  intraocular lens implant Left 2013    Moi Gallagher MD        MEDICATIONS:     Current Outpatient Medications:     LORazepam 0.5 MG Oral Tab, Take 1 tablet (0.5 mg total) by mouth every 6 (six) hours as needed for Anxiety., Disp: 30 tablet, Rfl: 0    Zolpidem Tartrate 3.5 MG Sublingual SL Tab, Place 1 tablet under the tongue at bedtime., Disp: 30 tablet, Rfl: 0    LORazepam 0.5 MG Oral Tab, Take 1 tablet (0.5 mg total) by mouth nightly., Disp: 30 tablet, Rfl: 0    latanoprost 0.005 % Ophthalmic Solution, Place 1 drop into both eyes every morning., Disp: 7.5 mL, Rfl: 3    Sildenafil Citrate 100 MG Oral Tab, Take 1 tablet (100 mg total) by mouth daily as needed for Erectile Dysfunction., Disp: 24  tablet, Rfl: 3    tiZANidine 2 MG Oral Tab, Take 1 tablet (2 mg total) by mouth 3 (three) times daily., Disp: 270 tablet, Rfl: 1    pravastatin 40 MG Oral Tab, Take 1 tablet (40 mg total) by mouth nightly., Disp: 90 tablet, Rfl: 3    amLODIPine Besy-Benazepril HCl 10-20 MG Oral Cap, Take 1 capsule by mouth daily., Disp: 90 capsule, Rfl: 3    potassium chloride 10 MEQ Oral Tab CR, Take 1 tablet (10 mEq total) by mouth 2 (two) times daily with meals., Disp: 180 tablet, Rfl: 3    Benazepril-hydroCHLOROthiazide 20-12.5 MG Oral Tab, Take 1 tablet by mouth daily., Disp: 90 tablet, Rfl: 3    furosemide 40 MG Oral Tab, Take 1 tablet (40 mg total) by mouth daily., Disp: , Rfl:     Dutasteride 0.5 MG Oral Cap, Take 1 capsule (0.5 mg total) by mouth daily., Disp: 90 capsule, Rfl: 0    ALLERGIES:    He has No Known Allergies.    SOCIAL HISTORY:    Patient  reports that he has never smoked. He has never been exposed to tobacco smoke. He has never used smokeless tobacco. He reports current alcohol use. He reports that he does not currently use drugs.    FAMILY HISTORY:    Patient's family history is not on file.    ROS:     A 12 point review of systems with pertinent positives and negatives listed in the HPI.    EXAM:    Resp 16   Ht 5' 7\" (1.702 m)   Wt 125 lb (56.7 kg)   BMI 19.58 kg/m²   GENERAL: alert and orientated X 3, well developed, well nourished, in no apparent distress  PSYCH: normal mood and affect  HEENT: ears and throat are clear  NECK: supple, no lymphadenopathy, thyroid wnl  CAROTID: no bruits  RESPIRATORY: no rales, rhonchi, or wheezes B  CARDIO: RRR without murmur, no murmur, no gallop   ABDOMEN: soft, non-tender with no palpable aneurysm or masses  BACK: normal, no tenderness  SKIN: no rashes, warm and dry  EXTREMITIES: no tenderness  NEURO: no sensory or motor deficits  VASCULAR:      Femoral Popliteal DP PT Peroneal   Right 1+       non-palpable non-palpable    Left 1+       non-palpable non-palpable      The right lower extremity has mild edema centered mainly around the lateral ankle area    LABS:   No results found for: \"EAG\", \"A1C\"   Lab Results   Component Value Date    GLU 84 10/28/2024    BUN 13 10/28/2024    CREATSERUM 0.90 10/28/2024    BUNCREA 14.4 10/28/2024    ANIONGAP 5 10/28/2024    GFRAA 106 10/18/2021    GFRNAA 92 10/18/2021    CA 9.9 10/28/2024     10/28/2024    K 3.2 (L) 10/28/2024     10/28/2024    CO2 34.0 (H) 10/28/2024    OSMOCALC 291 10/28/2024      Lab Results   Component Value Date    WBC 7.5 10/28/2024    RBC 3.10 (L) 10/28/2024    HGB 9.2 (L) 10/28/2024    HCT 28.2 (L) 10/28/2024    MCV 91.0 10/28/2024    MCH 29.7 10/28/2024    MCHC 32.6 10/28/2024    RDW 15.1 (H) 10/28/2024    .0 10/28/2024        Lab Results   Component Value Date    HGB 9.2 (L) 10/28/2024    HGB 9.4 (L) 10/26/2023    HGB 10.7 (L) 10/18/2021    HGB 11.5 (L) 04/29/2021    CREATSERUM 0.90 10/28/2024    CREATSERUM 0.71 10/26/2023    CREATSERUM 0.61 (L) 10/18/2021    CREATSERUM 1.00 04/29/2021         ASSESSMENT/PLAN:    I have reviewed the patient's prior documentation and studies from Epic and from [employer]    Diagnoses and all orders for this visit:    Leg edema, right    The patient has chronic right lower extremity edema that has been present for at least 3 years and appears to be well-managed.  There is no indication for repeating any studies except advising the patient to continue wearing compression stockings in order to control the degree of edema.  The edema does not seem to be disabling and I would favor conservative management which was explained to him and his son.    The patient indicated an understanding of these issues and agreed to the plan and all questions were answered during the clinic visit.      Thank you for allowing me to participate in your patient's care.   Please do not hesitate to contact me with any questions.    Sincerely,  Samer F. Najjar, MD    Please note: Carolyn speech  recognition software was used to prepare this note. If a word or phrase is confusing, it is likely do to a failure of recognition.   Please contact me with any questions or clarifications.

## 2024-12-16 ENCOUNTER — TELEPHONE (OUTPATIENT)
Dept: FAMILY MEDICINE CLINIC | Facility: CLINIC | Age: 89
End: 2024-12-16

## 2024-12-16 NOTE — TELEPHONE ENCOUNTER
Reached patient for medication adherence consult. Patient overdue for refill on amlodipine-benazepril and pravastatin per insurance report.    Patient reports that he is taking the medication as prescribed. He reports tolerating the medication well. He endorses the need for refill. Will call patient's pharmacy for refills.    Provided education on importance of adherence. Patient denies any questions or concerns with medication.

## 2024-12-18 ENCOUNTER — TELEPHONE (OUTPATIENT)
Dept: FAMILY MEDICINE CLINIC | Facility: CLINIC | Age: 89
End: 2024-12-18

## 2024-12-18 NOTE — TELEPHONE ENCOUNTER
Patient  son Dago calling ( name and date of birth of patient verified ) on Release of Information     Asking to send ophthalmology referral to Dr Robert office as patient has appointment  on Friday    Referral faxed  to [phone] via Right fax

## 2024-12-19 ENCOUNTER — TELEPHONE (OUTPATIENT)
Dept: FAMILY MEDICINE CLINIC | Facility: CLINIC | Age: 89
End: 2024-12-19

## 2024-12-19 NOTE — TELEPHONE ENCOUNTER
Namer and  verified.     Patients son stated he got a call from [employer] and they stated they were going to be sending someone out for a visit and they had reached out to the doctors office. He was asking if there was any record of them calling the office, after reviewing chart there is no record of them calling the office.     Patients son also asking if he can come get a new cup for a urine sample for the patient to complete because the patient accidentally threw out his cup. Informed him a new cup can be picked up.

## 2024-12-22 PROCEDURE — 81003 URINALYSIS AUTO W/O SCOPE: CPT | Performed by: FAMILY MEDICINE

## 2024-12-23 ENCOUNTER — LAB ENCOUNTER (OUTPATIENT)
Dept: LAB | Age: 89
End: 2024-12-23
Attending: FAMILY MEDICINE
Payer: MEDICARE

## 2024-12-23 LAB
BILIRUB UR QL: NEGATIVE
CLARITY UR: CLEAR
COLOR UR: YELLOW
GLUCOSE UR-MCNC: NORMAL MG/DL
HGB UR QL STRIP.AUTO: NEGATIVE
KETONES UR-MCNC: NEGATIVE MG/DL
LEUKOCYTE ESTERASE UR QL STRIP.AUTO: NEGATIVE
NITRITE UR QL STRIP.AUTO: NEGATIVE
PH UR: 6.5 [PH] (ref 5–8)
PROT UR-MCNC: NEGATIVE MG/DL
SP GR UR STRIP: 1.02 (ref 1–1.03)
UROBILINOGEN UR STRIP-ACNC: NORMAL

## 2025-02-28 ENCOUNTER — HOSPITAL ENCOUNTER (EMERGENCY)
Facility: HOSPITAL | Age: 89
Discharge: HOME | End: 2025-02-28
Attending: EMERGENCY MEDICINE
Payer: MEDICARE

## 2025-02-28 ENCOUNTER — NURSE TRIAGE (OUTPATIENT)
Dept: FAMILY MEDICINE CLINIC | Facility: CLINIC | Age: 89
End: 2025-02-28

## 2025-02-28 ENCOUNTER — APPOINTMENT (OUTPATIENT)
Dept: GENERAL RADIOLOGY | Facility: HOSPITAL | Age: 89
End: 2025-02-28
Attending: EMERGENCY MEDICINE
Payer: MEDICARE

## 2025-02-28 VITALS
HEART RATE: 79 BPM | HEIGHT: 70 IN | TEMPERATURE: 98 F | OXYGEN SATURATION: 100 % | RESPIRATION RATE: 20 BRPM | SYSTOLIC BLOOD PRESSURE: 161 MMHG | DIASTOLIC BLOOD PRESSURE: 68 MMHG | BODY MASS INDEX: 20.76 KG/M2 | WEIGHT: 145 LBS

## 2025-02-28 DIAGNOSIS — M19.90 INFLAMMATORY ARTHRITIS: ICD-10-CM

## 2025-02-28 DIAGNOSIS — M25.532 LEFT WRIST PAIN: Primary | ICD-10-CM

## 2025-02-28 PROCEDURE — 73110 X-RAY EXAM OF WRIST: CPT | Performed by: EMERGENCY MEDICINE

## 2025-02-28 PROCEDURE — 99284 EMERGENCY DEPT VISIT MOD MDM: CPT

## 2025-02-28 PROCEDURE — 73130 X-RAY EXAM OF HAND: CPT | Performed by: EMERGENCY MEDICINE

## 2025-02-28 RX ORDER — HYDROCODONE BITARTRATE AND ACETAMINOPHEN 5; 325 MG/1; MG/1
1 TABLET ORAL ONCE
Status: COMPLETED | OUTPATIENT
Start: 2025-02-28 | End: 2025-02-28

## 2025-02-28 RX ORDER — IBUPROFEN 600 MG/1
600 TABLET, FILM COATED ORAL ONCE
Status: COMPLETED | OUTPATIENT
Start: 2025-02-28 | End: 2025-02-28

## 2025-02-28 RX ORDER — LIDOCAINE 50 MG/G
1 PATCH TOPICAL EVERY 24 HOURS
Qty: 6 PATCH | Refills: 0 | Status: SHIPPED | OUTPATIENT
Start: 2025-02-28 | End: 2025-03-06

## 2025-02-28 RX ORDER — METHYLPREDNISOLONE 4 MG/1
TABLET ORAL
Qty: 1 EACH | Refills: 0 | Status: SHIPPED | OUTPATIENT
Start: 2025-02-28

## 2025-02-28 RX ORDER — HYDROCODONE BITARTRATE AND ACETAMINOPHEN 5; 325 MG/1; MG/1
1 TABLET ORAL EVERY 6 HOURS PRN
Qty: 10 TABLET | Refills: 0 | Status: SHIPPED | OUTPATIENT
Start: 2025-02-28

## 2025-02-28 NOTE — TELEPHONE ENCOUNTER
Action Requested: Summary for Provider     []  Critical Lab, Recommendations Needed  [] Need Additional Advice  []   FYI    []   Need Orders  [] Need Medications Sent to Pharmacy  []  Other     SUMMARY: Spoke with Kristine hurley, no power of  on file, asked her bring POA paperwork to office. She states patient has swelling to feet and hands as he reported to her. Patient was added on call, patient states it  has been 4-5 days since swelling started on lower extremities but then states his hands are swollen  which he describes as \"boxing gloves\" and progressing. No discoloration, denies difficulty breathing. Informed him he and niece that this warrants going to the Emergency room for evaluation. Kristine was able to add family member Daivd on phone and he states he will take patient to the Emergency room- [place].     Reason for call: Swelling  Onset: 4-5 days ago      Reason for Disposition   SEVERE swelling (e.g., swelling extends above knee, entire leg is swollen, weeping fluid)    Protocols used: Leg Swelling and Edema-A-OH      Postpone for follow up

## 2025-02-28 NOTE — ED PROVIDER NOTES
Patient Seen in: [facility] Emergency Department      History     Chief Complaint   Patient presents with    Arm Pain     Stated Complaint: Swelling Edema    Subjective:   HPI      82-year-old who is here with her nephew with history of chronic lower extremity swelling who presents now with new left wrist and hand swelling.  Right-hand-dominant.  No trauma.  No known history of gout or history of similar.  No fever.  Pain with movement.    Objective:     Past Medical History:    Arthritis    Cataract    Depression    Essential hypertension    Glaucoma    patient seeing JOSEPH for the first time today, patient is taking Latanoprost OU QHS started by Dr. Gallagher about 5 years ago    High blood pressure    High cholesterol    Hyperlipidemia    Visual impairment              Past Surgical History:   Procedure Laterality Date    Cataract extraction w/  intraocular lens implant Right 05/02/2017    Moi Gallagher MD    Cataract extraction w/  intraocular lens implant Left 02/05/2013    Moi Gallagher MD                Social History     Socioeconomic History    Marital status:    Tobacco Use    Smoking status: Never     Passive exposure: Never    Smokeless tobacco: Never   Vaping Use    Vaping status: Never Used   Substance and Sexual Activity    Alcohol use: Not Currently     Comment: 1- 2 times per week     Drug use: Never   Other Topics Concern     Service No    Blood Transfusions No    Caffeine Concern No    Occupational Exposure No    Hobby Hazards No    Sleep Concern No    Stress Concern No    Weight Concern No    Special Diet No    Back Care No    Exercise No    Bike Helmet No    Seat Belt No    Self-Exams No     Social Drivers of Health      [facility]    Housing Stability                  Physical Exam     ED Triage Vitals [02/28/25 1257]   /68   Pulse 82   Resp 16   Temp 98.2 °F (36.8 °C)   Temp src Temporal   SpO2 99 %    O2 Device None (Room air)       Current Vitals:   Vital Signs  BP: (!) 161/68  Pulse: 79  Resp: 20  Temp: 98.2 °F (36.8 °C)  Temp src: Temporal    Oxygen Therapy  SpO2: 100 %  O2 Device: None (Room air)        Physical Exam    Constitutional: Oriented to person, place, and time.  Appears well-developed. No distress.   Head: Normocephalic and atraumatic.   Eyes: Conjunctivae are normal. Pupils are equal, round, and reactive to light.   Neck: Normal range of motion. Neck supple.   Cardiovascular: Normal rate, regular rhythm and intact distal pulses.    Pulmonary/Chest: Effort normal. No respiratory distress.  Chronic appearing bilateral lower extremity edema.  There is swelling and slight warmth of the dorsal  Musculoskeletal: Left wrist tracking into the dorsal hand.  There are some finger swelling as well.  No redness.  Radial pulse strong.  No volar findings.  Pain with range of motion of the wrist.  Neurological: Alert and oriented to person, place, and time.   Skin: Skin is warm and dry.   Nursing note and vitals reviewed.    Differential diagnosis includes wrist inflammatory Tritus, gouty arthritis, less likely fracture.    ED Course   Labs Reviewed - No data to display         XR HAND (MIN 3 VIEWS), LEFT (CPT=73130)    Result Date: 2/28/2025  PROCEDURE: XR HAND (MIN 3 VIEWS), LEFT (CPT=73130)  COMPARISON: None.  INDICATIONS: Left hand swelling x1 week.  TECHNIQUE: 3 views were obtained.           CONCLUSION:  1. Subtle nondisplaced ulnar styloid tip fracture of uncertain age. 2. 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 3. Generalized soft tissue swelling.  No soft tissue gas or osteomyelitis.    Dictated by (CST): Paramjit Rachel MD on 2/28/2025 at 5:09 PM     Finalized by (CST): Paramjit Rachel MD on 2/28/2025 at 5:10 PM          XR WRIST COMPLETE (MIN 3 VIEWS), LEFT (CPT=73110)    Result Date: 2/28/2025  PROCEDURE: XR WRIST COMPLETE (MIN 3 VIEWS), LEFT (CPT=73110)   COMPARISON: None.  INDICATIONS: Left wrist pain x1 week.  TECHNIQUE: 3 views were obtained.           CONCLUSION:  1. Nondisplaced ulnar styloid tip fracture of uncertain age. 2. No additional fractures. 3. Osteoarthritis involving 1st CMC joint, and to a lesser extent triscaphe joint, and 5th MCP. 4. A chronic os ossific density distal to the ulna. 5. Soft tissue swelling.  No soft tissue gas or osteomyelitis.    Dictated by (CST): Paramjit Rachel MD on 2/28/2025 at 5:07 PM     Finalized by (CST): Paramjit Rachel MD on 2/28/2025 at 5:09 PM                [facility]              Medical Decision Making  Imaging reassuring.  Patient feels little better.  Will place in a splint remobilization.  Ace wrap as well.  Recommended ice and heat.  Will do a course of steroids.  I will give him something for pain.  Afebrile and have clinically no concern for a septic arthritis.  Patient will follow-up and come back with worsening or change    Amount and/or Complexity of Data Reviewed  Radiology: ordered and independent interpretation performed. Decision-making details documented in ED Course.     Details: By my gross review of the left wrist and hand x-ray did not appreciate gross obvious evidence of fracture or bony malalignment    Risk  OTC drugs.  Prescription drug management.        Disposition and Plan     Clinical Impression:  1. Left wrist pain    2. Inflammatory arthritis         Disposition:  Discharge  2/28/2025  5:24 pm    Follow-up:  Austin Mccann, DO  [address] [place] [address]  [address] [place] [address]  [phone]    Call      Gurwinder Partida MD  [facility]  [address] [place] [address]  [phone]    Schedule an appointment as soon as possible for a visit      We recommend that you schedule follow up care with a primary care provider within the next three months to obtain basic health screening including reassessment of your blood pressure.      Medications Prescribed:  Current Discharge Medication  List        START taking these medications    Details   methylPREDNISolone 4 MG Oral Tablet Therapy Pack Dosepack: use as directed on packaging  Qty: 1 each, Refills: 0    Associated Diagnoses: Left wrist pain; Inflammatory arthritis      HYDROcodone-acetaminophen 5-325 MG Oral Tab Take 1 tablet by mouth every 6 (six) hours as needed.  Qty: 10 tablet, Refills: 0    Associated Diagnoses: Left wrist pain; Inflammatory arthritis      lidocaine (LIDODERM) 5 % External Patch Place 1 patch onto the skin daily for 6 days.  Qty: 6 patch, Refills: 0    Associated Diagnoses: Inflammatory arthritis                 Supplementary Documentation:

## 2025-02-28 NOTE — ED INITIAL ASSESSMENT (HPI)
Pt ambulatory to ED A&O x 4 w/ c/o L arm pain and swelling for approx 1 week.  Pt here w/ newphew and nephew reporting pt also has B/L LE edema.  Pt denies chest pain or RAMON.

## 2025-03-03 ENCOUNTER — PATIENT OUTREACH (OUTPATIENT)
Dept: CASE MANAGEMENT | Age: 89
End: 2025-03-03

## 2025-03-03 ENCOUNTER — TELEPHONE (OUTPATIENT)
Dept: FAMILY MEDICINE CLINIC | Facility: CLINIC | Age: 89
End: 2025-03-03

## 2025-03-03 NOTE — TELEPHONE ENCOUNTER
Called and spoke to patient who aske dme to call his niece tonya at [phone]. Per niece they already have appt scheduled for 03/19.

## 2025-03-03 NOTE — PROGRESS NOTES
Transitions of Care Navigation  Discharge Date: 25  Contact Date: 3/3/2025    Transitions of Care Assessment:  LIZZIE Initial Assessment    General:  Assessment completed with: Patient  Patient Subjective: NCM spoke with patient states he is feeling okay. Patient reports left wrist pain at 8/10. He denies any numbness, tingling or any others. Patient denies any fevers, chills, nausea, vomiting, shortness of breath, chest pain or any others. He states is applying heat and ice to left wrist. Patient will  prescriptions from pharmacy today. He prefers TST team calls Kristine caregiver at [phone], he states she schedules and coordinates all appointments for him. He denies any questions or concerns at this time.  Chief Complaint: left wrist pain  Verify patient name and  with patient/ caregiver: Yes    Hospital Stay/Discharge:  Tell me what you understand of why you were in the hospital or emergency department: pt reports to ED with left wrist pain.  Prior to leaving the hospital were your Discharge Instructions reviewed with you?: Yes  Did you receive a copy of your written Discharge Instructions?: Yes  What questions do you have about your Discharge Instructions?: none  Do you feel better or worse since you left the hospital or emergency department?: Same    Follow - Up Appointment:  Do you have a follow-up appointment?: No  Are there any barriers to getting to your follow-up appointment?: No    Home Health/DME:  Prior to leaving the hospital was Home Health (HH) arranged for you?: N/A  Are HH needs identified by staff during the assessment?: No     Prior to leaving the hospital or emergency department was Durable Medical Equipment (DME), medical supplies, or infusions arranged for you?: N/A  Are DME/medical supply/infusions needs identified by staff during this assessment?: No     Medications/Diet:  Did any of your medications change, during or after your hospital stay or ED visit?: Yes  Do you have  your new or updated medications?: No (Pt will  today.)  Do you understand what your medications are for and possible side effects?: Yes  Are there any reasons that keep you from taking your medication as prescribed?: No  Any concerns about medication refills?: No    Were you given a different diet per your Discharge Instructions?: No  Reason: n/a     Questions/Concerns:  Do you have any questions or concerns that have not been discussed?: No   LIZZIE Follow-up Assessment    General:  Assessment completed with: Patient  Patient Subjective: NCM spoke with patient states he is feeling okay. Patient reports left wrist pain at 8/10. He denies any numbness, tingling or any others. Patient denies any fevers, chills, nausea, vomiting, shortness of breath, chest pain or any others. He states is applying heat and ice to left wrist. Patient will  prescriptions from pharmacy today. He prefers TST team calls Kristine philip at [phone], he states she schedules and coordinates all appointments for him. He denies any questions or concerns at this time.  Chief Complaint: left wrist pain      Nursing Interventions:  All discharge instructions reviewed with the patient. Reviewed when to call MD vs when to call 911 or go the ED. Educated patient on the importance of taking all meds as prescribed as well as close f/u with PCP/specialists. Pt verbalized understanding and will contact the office with any further questions or concerns. Patient denies fevers, chills, nausea, vomiting, shortness of breath, chest pain, or any other symptoms at this time.  NCM attempted to schedule HFU, patient declined will call PCP/TCC office directly. NC sent TE to PCP office re: assistance in scheduling HFU appt. [facility] provided contact information for any further questions/concerns. Patient verbalized understanding and agreeable.         Medications:  Medication Reconciliation:  I am aware of an inpatient discharge within the last 30  days.  The discharge medication list has been reconciled with the patient's current medication list and reviewed by me. See medication list for additions of new medication, and changes to current doses of medications and discontinued medications.  Current Outpatient Medications   Medication Sig Dispense Refill    methylPREDNISolone 4 MG Oral Tablet Therapy Pack Dosepack: use as directed on packaging 1 each 0    HYDROcodone-acetaminophen 5-325 MG Oral Tab Take 1 tablet by mouth every 6 (six) hours as needed. 10 tablet 0    lidocaine (LIDODERM) 5 % External Patch Place 1 patch onto the skin daily for 6 days. 6 patch 0    LORazepam 0.5 MG Oral Tab Take 1 tablet (0.5 mg total) by mouth every 6 (six) hours as needed for Anxiety. 30 tablet 0    Zolpidem Tartrate 3.5 MG Sublingual SL Tab Place 1 tablet under the tongue at bedtime. 30 tablet 0    LORazepam 0.5 MG Oral Tab Take 1 tablet (0.5 mg total) by mouth nightly. 30 tablet 0    latanoprost 0.005 % Ophthalmic Solution Place 1 drop into both eyes every morning. 7.5 mL 3    Sildenafil Citrate 100 MG Oral Tab Take 1 tablet (100 mg total) by mouth daily as needed for Erectile Dysfunction. 24 tablet 3    tiZANidine 2 MG Oral Tab Take 1 tablet (2 mg total) by mouth 3 (three) times daily. 270 tablet 1    pravastatin 40 MG Oral Tab Take 1 tablet (40 mg total) by mouth nightly. 90 tablet 3    amLODIPine Besy-Benazepril HCl 10-20 MG Oral Cap Take 1 capsule by mouth daily. 90 capsule 3    potassium chloride 10 MEQ Oral Tab CR Take 1 tablet (10 mEq total) by mouth 2 (two) times daily with meals. 180 tablet 3    Benazepril-hydroCHLOROthiazide 20-12.5 MG Oral Tab Take 1 tablet by mouth daily. 90 tablet 3    furosemide 40 MG Oral Tab Take 1 tablet (40 mg total) by mouth daily.      Dutasteride 0.5 MG Oral Cap Take 1 capsule (0.5 mg total) by mouth daily. 90 capsule 0         Follow-up Appointments:  Your appointments       Date & Time Appointment Department (Center)    Oct 28, 2025  11:00 AM CDT Medicare Annual Well Visit with Austin Mccann,  [facility]              [facility]  [address] [address]  [phone]            Transitional Care Clinic  Was TCC Ordered: No      Primary Care Provider (If no TCC appointment)  Does patient already have a PCP appointment scheduled? No  Nurse Care Manager Attempted to schedule PCP office ER Follow-up appointment with patient   -If no appointment scheduled: Explain : pt declined prefers TST calls Kristine caregiver.     Specialist  Does the patient have any other follow-up appointment(s) need to be scheduled? Yes   -If yes: Nurse Care Manager reviewed upcoming specialist appointments with patient: Yes   -Does the patient need assistance scheduling appointment(s): Yes, message to TST team      Book By Date: 3/7/25

## 2025-03-03 NOTE — PROGRESS NOTES
LIZZIE reached out for scheduling assistance.    Austin Mccann D.O.  [address]  [place], [place] [id number] [phone]  Office to contact the patient  LIZZIE reached out to the office.    Gurwinder Partida M.D.  Orthopedic Surgery  [facility] at [address]  [place] [address]  [phone]    Attempt #1:  Left message on voicemail for patient to call transitions specialist back to schedule follow up appointments. Provided Transitions specialist scheduling phone number [phone].

## 2025-03-03 NOTE — PROGRESS NOTES
TCM has contacted patient and patient needs additional appointments.  Please contact patient for assistance with scheduling a follow-up appointment for  PCP and ortho .  Thank you!    Per patient please call his caregiver Kristine who takes care of scheduling appointments at [phone]. Thank you      Follow-up:  Austin Mccann, DO  [address] [facility] [address]  [facility] [address]  [phone]          Gurwinder Partida MD  [address] [facility] [address]  [place] [id number] [phone]

## 2025-03-03 NOTE — TELEPHONE ENCOUNTER
Spoke to patient for Transitions of Care call today.  Patient does not have an appointment scheduled at this time.  ER Follow-up appointment needed by 03/7/25.  Please advise.    BOOK BY DATE: 03/7/25    Clinical staff:  Please follow-up with patient and try to get them to schedule as patient would greatly benefit from ER Follow-up.  Thank you!

## 2025-03-04 NOTE — PROGRESS NOTES
LIZZIE reached out for scheduling assistance.  ED Hospital Follow up for PCP/ (Discharge 2/28 elm)     PCP  Austin Mccann D.O.  [address]  [place] [address] [phone]  Existing appt made for 3/19@12:30pm  Orthopedic  Gurwinder Partida M.D.  Orthopedic Surgery  [facility] at [place] [address] [facility] [address]  [place], [place] [address] [phone]  Pt declined to make follow up yoko     Confirmed with pt   Closing encounter

## 2025-03-15 DIAGNOSIS — E78.5 HYPERLIPIDEMIA, UNSPECIFIED HYPERLIPIDEMIA TYPE: ICD-10-CM

## 2025-03-15 DIAGNOSIS — I10 ESSENTIAL HYPERTENSION: ICD-10-CM

## 2025-03-18 RX ORDER — AMLODIPINE AND BENAZEPRIL HYDROCHLORIDE 10; 20 MG/1; MG/1
1 CAPSULE ORAL DAILY
Qty: 90 CAPSULE | Refills: 0 | Status: SHIPPED | OUTPATIENT
Start: 2025-03-18

## 2025-03-18 RX ORDER — PRAVASTATIN SODIUM 40 MG
40 TABLET ORAL NIGHTLY
Qty: 90 TABLET | Refills: 3 | Status: SHIPPED | OUTPATIENT
Start: 2025-03-18

## 2025-03-27 ENCOUNTER — OFFICE VISIT (OUTPATIENT)
Dept: FAMILY MEDICINE CLINIC | Facility: CLINIC | Age: 89
End: 2025-03-27

## 2025-03-27 ENCOUNTER — TELEPHONE (OUTPATIENT)
Dept: FAMILY MEDICINE CLINIC | Facility: CLINIC | Age: 89
End: 2025-03-27

## 2025-03-27 ENCOUNTER — LAB ENCOUNTER (OUTPATIENT)
Dept: LAB | Age: 89
End: 2025-03-27
Attending: FAMILY MEDICINE
Payer: MEDICARE

## 2025-03-27 VITALS
SYSTOLIC BLOOD PRESSURE: 100 MMHG | TEMPERATURE: 98 F | WEIGHT: 124 LBS | DIASTOLIC BLOOD PRESSURE: 60 MMHG | HEART RATE: 102 BPM | RESPIRATION RATE: 18 BRPM | OXYGEN SATURATION: 98 % | BODY MASS INDEX: 19.46 KG/M2 | HEIGHT: 67 IN

## 2025-03-27 DIAGNOSIS — R46.89 COGNITIVE AND BEHAVIORAL CHANGES: ICD-10-CM

## 2025-03-27 DIAGNOSIS — M79.642 LEFT HAND PAIN: ICD-10-CM

## 2025-03-27 DIAGNOSIS — R41.89 COGNITIVE AND BEHAVIORAL CHANGES: ICD-10-CM

## 2025-03-27 DIAGNOSIS — R60.0 BILATERAL LEG EDEMA: Primary | ICD-10-CM

## 2025-03-27 DIAGNOSIS — I10 ESSENTIAL HYPERTENSION: ICD-10-CM

## 2025-03-27 DIAGNOSIS — N39.0 URINARY TRACT INFECTION WITHOUT HEMATURIA, SITE UNSPECIFIED: ICD-10-CM

## 2025-03-27 DIAGNOSIS — R29.898 WEAKNESS OF BOTH LOWER EXTREMITIES: ICD-10-CM

## 2025-03-27 DIAGNOSIS — R09.89 DIMINISHED PULSES IN LOWER EXTREMITY: ICD-10-CM

## 2025-03-27 PROCEDURE — 3008F BODY MASS INDEX DOCD: CPT | Performed by: FAMILY MEDICINE

## 2025-03-27 PROCEDURE — 3078F DIAST BP <80 MM HG: CPT | Performed by: FAMILY MEDICINE

## 2025-03-27 PROCEDURE — 99214 OFFICE O/P EST MOD 30 MIN: CPT | Performed by: FAMILY MEDICINE

## 2025-03-27 PROCEDURE — 1125F AMNT PAIN NOTED PAIN PRSNT: CPT | Performed by: FAMILY MEDICINE

## 2025-03-27 PROCEDURE — 3074F SYST BP LT 130 MM HG: CPT | Performed by: FAMILY MEDICINE

## 2025-03-27 PROCEDURE — 1159F MED LIST DOCD IN RCRD: CPT | Performed by: FAMILY MEDICINE

## 2025-03-27 RX ORDER — BENAZEPRIL HYDROCHLORIDE 20 MG/1
20 TABLET ORAL DAILY
Qty: 90 TABLET | Refills: 1 | Status: SHIPPED | OUTPATIENT
Start: 2025-03-27 | End: 2025-09-23

## 2025-03-27 NOTE — PATIENT INSTRUCTIONS
Diuresis as needed.  Arterial doppler recommended for the bilateral lower extremities..  Occupational and physical therapy to be ordered  Pending the evaluation from home health.  Medication review during this encounter.  Amlodipine/benazepril combo 10/20 to be held for now.  Benazepril/hydrochlorothiazide 20/12.5 mg to be held for now.  Benazepril 20 mg to be taken orally daily.

## 2025-03-27 NOTE — TELEPHONE ENCOUNTER
[facility] pharmacy asking to clarify if patient is supposed to be taking Benazepril or the Benazepril combo medication. Advised below:          Return if symptoms worsen or fail to improve.  Diuresis as needed.  Arterial doppler recommended for the bilateral lower extremities..  Occupational and physical therapy to be ordered  Pending the evaluation from home health.  Medication review during this encounter.  Amlodipine/benazepril combo 10/20 to be held for now.  Benazepril/hydrochlorothiazide 20/12.5 mg to be held for now.  Benazepril 20 mg to be taken orally daily.

## 2025-03-28 ENCOUNTER — LAB ENCOUNTER (OUTPATIENT)
Dept: LAB | Age: 89
End: 2025-03-28
Attending: FAMILY MEDICINE
Payer: MEDICARE

## 2025-03-28 DIAGNOSIS — N39.0 URINARY TRACT INFECTION WITHOUT HEMATURIA, SITE UNSPECIFIED: ICD-10-CM

## 2025-03-28 LAB
BILIRUB UR QL: NEGATIVE
CLARITY UR: CLEAR
COLOR UR: YELLOW
GLUCOSE UR-MCNC: NORMAL MG/DL
HGB UR QL STRIP.AUTO: NEGATIVE
KETONES UR-MCNC: NEGATIVE MG/DL
LEUKOCYTE ESTERASE UR QL STRIP.AUTO: 250
NITRITE UR QL STRIP.AUTO: NEGATIVE
PH UR: 6 [PH] (ref 5–8)
PROT UR-MCNC: NEGATIVE MG/DL
SP GR UR STRIP: 1.01 (ref 1–1.03)
UROBILINOGEN UR STRIP-ACNC: 2

## 2025-03-28 PROCEDURE — 87086 URINE CULTURE/COLONY COUNT: CPT

## 2025-03-28 PROCEDURE — 81001 URINALYSIS AUTO W/SCOPE: CPT

## 2025-03-29 DIAGNOSIS — R39.9 UTI SYMPTOMS: Primary | ICD-10-CM

## 2025-03-29 DIAGNOSIS — R82.90 ABNORMAL URINALYSIS: ICD-10-CM

## 2025-03-29 RX ORDER — CIPROFLOXACIN 250 MG/1
250 TABLET, FILM COATED ORAL 2 TIMES DAILY
Qty: 14 TABLET | Refills: 0 | Status: SHIPPED | OUTPATIENT
Start: 2025-03-29 | End: 2025-04-05

## 2025-04-02 ENCOUNTER — TELEPHONE (OUTPATIENT)
Age: 89
End: 2025-04-02

## 2025-04-02 NOTE — TELEPHONE ENCOUNTER
I am reaching out from the [facility] Navigation department, following up on an order from your provider's office to assist in connecting you with resources for care. If you would like to discuss this further, please give us a call back at [phone], or for more immediate assistance you can contact our 24-hour help line at [phone]. We look forward to hearing from you soon.

## 2025-04-02 NOTE — PROGRESS NOTES
Subjective:     Patient ID: Jaylan Ibanez is a 89 year old male.    This patient is an 89-year-old hypertensive -American gentleman with bilateral lower extremity swelling and also bilateral lower extremity weakness who presents to the clinic today accompanied by his niece who is a legal authority regarding the patient's health care.  The niece states that there has been a slow but notable plan and the patient's cognition.  He is still living essentially independently most of the time.  Patient does consume food, but to a lesser volume.  He is able to eliminate effectively for both urine and stool.    Because of the patient's decline in the frequency of physical mobility and because of the change in cognitive function, the niece is seeking home health services, so that there can be more paramedical eyes on the patient as well as make assessment for other specialty services that the patient may benefit from and maintain his independence is much as possible, such as PT/OT.    The following information has been communicated to the behavioral health consultant who will be making an assessment regarding this patient's cognitive capacity:    Please evaluate this 89-year-old -American gentleman who family members have noted progressively increasing cognitive changes.  Psychological testing will be necessary and possibly specialist, i.e. psychologist/psychiatrist.        History/Other:   Review of Systems  Current Outpatient Medications   Medication Sig Dispense Refill    ciprofloxacin 250 MG Oral Tab Take 1 tablet (250 mg total) by mouth 2 (two) times daily for 7 days. 14 tablet 0    Benazepril HCl 20 MG Oral Tab Take 1 tablet (20 mg total) by mouth daily. 90 tablet 1    AMLODIPINE BESY-BENAZEPRIL HCL 10-20 MG Oral Cap Take 1 capsule by mouth once daily 90 capsule 0    PRAVASTATIN 40 MG Oral Tab Take 1 tablet by mouth nightly 90 tablet 3    methylPREDNISolone 4 MG Oral Tablet Therapy Pack Dosepack: use as  directed on packaging 1 each 0    HYDROcodone-acetaminophen 5-325 MG Oral Tab Take 1 tablet by mouth every 6 (six) hours as needed. 10 tablet 0    LORazepam 0.5 MG Oral Tab Take 1 tablet (0.5 mg total) by mouth every 6 (six) hours as needed for Anxiety. 30 tablet 0    Zolpidem Tartrate 3.5 MG Sublingual SL Tab Place 1 tablet under the tongue at bedtime. 30 tablet 0    LORazepam 0.5 MG Oral Tab Take 1 tablet (0.5 mg total) by mouth nightly. 30 tablet 0    latanoprost 0.005 % Ophthalmic Solution Place 1 drop into both eyes every morning. 7.5 mL 3    Sildenafil Citrate 100 MG Oral Tab Take 1 tablet (100 mg total) by mouth daily as needed for Erectile Dysfunction. 24 tablet 3    tiZANidine 2 MG Oral Tab Take 1 tablet (2 mg total) by mouth 3 (three) times daily. 270 tablet 1    potassium chloride 10 MEQ Oral Tab CR Take 1 tablet (10 mEq total) by mouth 2 (two) times daily with meals. 180 tablet 3    Benazepril-hydroCHLOROthiazide 20-12.5 MG Oral Tab Take 1 tablet by mouth daily. 90 tablet 3    furosemide 40 MG Oral Tab Take 1 tablet (40 mg total) by mouth daily.      Dutasteride 0.5 MG Oral Cap Take 1 capsule (0.5 mg total) by mouth daily. 90 capsule 0     Allergies:Allergies[1]    Past Medical History:    Arthritis    Cataract    Depression    Essential hypertension    Glaucoma    patient seeing [facility] for the [facility] time today, patient is taking Latanoprost OU QHS started by Dr. Gallagher about 5 years ago    High blood pressure    High cholesterol    Hyperlipidemia    Visual impairment      Past Surgical History:   Procedure Laterality Date    Cataract extraction w/  intraocular lens implant Right 05/02/2017    Moi Gallagher MD    Cataract extraction w/  intraocular lens implant Left 02/05/2013    Moi Gallagher MD      Family History   Problem Relation Age of Onset    Diabetes Neg     Glaucoma Neg     Macular degeneration Neg       Social History:   Social History     Socioeconomic History    Marital status:     Tobacco Use    Smoking status: Never     Passive exposure: Never    Smokeless tobacco: Never   Vaping Use    Vaping status: Never Used   Substance and Sexual Activity    Alcohol use: Not Currently     Comment: 1- 2 times per week     Drug use: Never   Other Topics Concern     Service No    Blood Transfusions No    Caffeine Concern No    Occupational Exposure No    Hobby Hazards No    Sleep Concern No    Stress Concern No    Weight Concern No    Special Diet No    Back Care No    Exercise No    Bike Helmet No    Seat Belt No    Self-Exams No     Social Drivers of Health     Food Insecurity: No Food Insecurity (3/3/2025)    NCSS - Food Insecurity     Worried About Running Out of Food in the Last Year: No     Ran Out of Food in the Last Year: No   Transportation Needs: No Transportation Needs (3/3/2025)    NCSS - Transportation     Lack of Transportation: No   Housing Stability: Not At Risk (3/3/2025)    NCSS - Housing/Utilities     Has Housing: Yes     Worried About Losing Housing: No     Unable to Get Utilities: No        Objective:   Vitals:    25 1506   BP: 100/60   Pulse:    Resp:    Temp:        Physical Exam  Constitutional:       General: He is not in acute distress.     Appearance: He is not ill-appearing.   Cardiovascular:      Rate and Rhythm: Normal rate and regular rhythm.      Pulses:           Dorsalis pedis pulses are 0 on the right side and 0 on the left side.        Posterior tibial pulses are 0 on the right side and 0 on the left side.      Heart sounds:      No gallop.      Comments: There is difficulty in assessing the patient's bilateral lower extremity pulses secondary to be edema present.  Pulmonary:      Effort: Pulmonary effort is normal. No respiratory distress.      Breath sounds: Normal breath sounds.   Musculoskeletal:      Right lower le+ Pitting Edema present.      Left lower le+ Pitting Edema present.   Neurological:      Mental Status: He is alert.    Psychiatric:         Attention and Perception: Attention normal.         Mood and Affect: Mood and affect normal.         Speech: Speech normal.         Behavior: Behavior normal.         Judgment: Judgment normal.      Comments: During this encounter the patient was able to respond to questions independently and accurately.  Cognition seems to be intact on today.         Assessment & Plan:   1. Bilateral leg edema  Patient has been referred to home health in order that they can make an assessment and see what other services patient may benefit from including PT/OT.  Pulses are diminished and so lower extremity Doppler for arterial flow is appropriate.  Diminished pulses may be secondary to the leg edema.  - Home Health Referral - In Network  - US ART LOWER EXT BILAT DOPPLER W SEG PRESSURES (CPT=93923); Future    2. Cognitive and behavioral changes  Patient being referred.  - [facility] Referral - In Network  - Home Health Referral - In Network    3. Left hand pain  PT/OT may be beneficial.  If needed, we can refer the patient to orthopedics or plastics/hand consult.  - Home Health Referral - In Network    4. Essential hypertension  Antihypertensive medication reviewed and renewed.  Compliance emphasized and encouraged.  Minimize extra salt usage/intake.  - Home Health Referral - In Network  - Benazepril HCl 20 MG Oral Tab; Take 1 tablet (20 mg total) by mouth daily.  Dispense: 90 tablet; Refill: 1    5. Weakness of both lower extremities  Again, home health to make an assessment to see the needs that may be met with specific/specialty services.  - Home Health Referral - In Network    6. Diminished pulses in lower extremity  Likely secondary to edema, however we will do arterial Doppler studies to ensure that the patient has adequate  - US ART LOWER EXT BILAT DOPPLER W SEG PRESSURES (CPT=93923); Future      No orders of the defined types were placed in this encounter.      Meds This Visit:  Requested Prescriptions      Signed Prescriptions Disp Refills    Benazepril HCl 20 MG Oral Tab 90 tablet 1     Sig: Take 1 tablet (20 mg total) by mouth daily.       [facility] & Referrals:  [facility]  OP REFERRAL TO HOME HEALTH  US ART LOWER EXT BILAT DOPPLER W SEG PRESSURES (CPT=93923)     Patient Instructions   Diuresis as needed.  Arterial doppler recommended for the bilateral lower extremities..  Occupational and physical therapy to be ordered  Pending the evaluation from home health.  Medication review during this encounter.  Amlodipine/benazepril combo 10/20 to be held for now.  Benazepril/hydrochlorothiazide 20/12.5 mg to be held for now.  Benazepril 20 mg to be taken orally daily.    Return if symptoms worsen or fail to improve.         [1] No Known Allergies

## 2025-04-10 ENCOUNTER — HOSPITAL ENCOUNTER (OUTPATIENT)
Dept: ULTRASOUND IMAGING | Facility: HOSPITAL | Age: 89
Discharge: HOME | End: 2025-04-10
Attending: FAMILY MEDICINE
Payer: MEDICARE

## 2025-04-10 DIAGNOSIS — R09.89 DIMINISHED PULSES IN LOWER EXTREMITY: ICD-10-CM

## 2025-04-10 DIAGNOSIS — R60.0 BILATERAL LEG EDEMA: ICD-10-CM

## 2025-04-10 PROCEDURE — 93923 UPR/LXTR ART STDY 3+ LVLS: CPT | Performed by: FAMILY MEDICINE

## 2025-04-22 ENCOUNTER — TELEPHONE (OUTPATIENT)
Dept: FAMILY MEDICINE CLINIC | Facility: CLINIC | Age: 89
End: 2025-04-22

## 2025-04-22 DIAGNOSIS — M79.642 LEFT HAND PAIN: ICD-10-CM

## 2025-04-22 DIAGNOSIS — I10 ESSENTIAL HYPERTENSION: ICD-10-CM

## 2025-04-22 DIAGNOSIS — R46.89 COGNITIVE AND BEHAVIORAL CHANGES: ICD-10-CM

## 2025-04-22 DIAGNOSIS — R41.89 COGNITIVE AND BEHAVIORAL CHANGES: ICD-10-CM

## 2025-04-22 DIAGNOSIS — R60.0 BILATERAL LEG EDEMA: Primary | ICD-10-CM

## 2025-04-22 DIAGNOSIS — R29.898 WEAKNESS OF BOTH LOWER EXTREMITIES: ICD-10-CM

## 2025-04-22 NOTE — TELEPHONE ENCOUNTER
----- Message from Daksha Lea sent at 4/21/2025  3:33 PM CDT -----  Hi there, Would it be possible to place a home health referral for this patient? He lives alone with limited family around. His POA lives out of [place] and is concerned about him aging and his current health/his ability to entirely care for himself. If this is possible, please let me know!Thanks, Daksha

## 2025-04-23 NOTE — TELEPHONE ENCOUNTER
Called Kristine, confirmed patient's name and .    Advised that home health order has been placed and to let us know if she does not hear from them in a few days.

## 2025-05-14 ENCOUNTER — MED REC SCAN ONLY (OUTPATIENT)
Dept: FAMILY MEDICINE CLINIC | Facility: CLINIC | Age: 89
End: 2025-05-14

## 2025-05-27 ENCOUNTER — TELEPHONE (OUTPATIENT)
Dept: FAMILY MEDICINE CLINIC | Facility: CLINIC | Age: 89
End: 2025-05-27

## 2025-05-27 NOTE — TELEPHONE ENCOUNTER
Consider this written approval my verbal consent so that home health can proceed with all services needed and attempts to improve this patient's quality of health.  Please contact the caller and let them know.

## 2025-05-27 NOTE — TELEPHONE ENCOUNTER
I called and left detailed message for Hero with verbal order.   If questions or something more needed, Hero or team to call us back. Number to office left.

## 2025-05-27 NOTE — TELEPHONE ENCOUNTER
Hero from [facility] called requesting an additional occupational therapy session. She stated she will need a verbal response back.

## 2025-05-29 ENCOUNTER — TELEPHONE (OUTPATIENT)
Dept: FAMILY MEDICINE CLINIC | Facility: CLINIC | Age: 89
End: 2025-05-29

## 2025-05-29 NOTE — TELEPHONE ENCOUNTER
Zaynab from [facility] called requesting verbal orders to add a  for care giving resources.

## 2025-06-02 NOTE — TELEPHONE ENCOUNTER
Spoke to Zaynab at [facility]. Verbal orders given. Zaynab verbalized understanding. No further questions at this time.

## 2025-06-03 ENCOUNTER — MED REC SCAN ONLY (OUTPATIENT)
Dept: FAMILY MEDICINE CLINIC | Facility: CLINIC | Age: 89
End: 2025-06-03

## 2025-07-22 ENCOUNTER — TELEPHONE (OUTPATIENT)
Dept: FAMILY MEDICINE CLINIC | Facility: CLINIC | Age: 89
End: 2025-07-22

## 2025-07-22 NOTE — TELEPHONE ENCOUNTER
Please let the caller know to except this communication as my verbal approval for the discharge of the patient from the home health service.

## 2025-07-22 NOTE — TELEPHONE ENCOUNTER
BENOIT Graham  calling from [facility]     Santos states the patient's insurance will  not cover any type of home visits past July 4th ;     Santos states the patient will have to be discharged from home health and she needs a verbal order for discharge    Dr. Mccann - Please advise and thank you.          Staff -may leave provider's response  on confidential Voice Mail  for  Santos   at  [phone]

## 2025-07-23 NOTE — TELEPHONE ENCOUNTER
Called Santos with [facility] and relayed message form Dr Radha Mccann- left on confidential VM

## (undated) DIAGNOSIS — H40.9 GLAUCOMA OF BOTH EYES, UNSPECIFIED GLAUCOMA TYPE: ICD-10-CM

## (undated) DEVICE — PENROSE DRAIN 12" X 1/4: Brand: CARDINAL HEALTH

## (undated) DEVICE — SUTURE NUROLON 0 C545G

## (undated) DEVICE — SUTURE MONOCRYL 3-0 Y497G

## (undated) DEVICE — HEX-LOCKING BLADE ELECTRODE: Brand: EDGE

## (undated) DEVICE — SPONGE: SPECIALTY PEANUT XR 100/CS: Brand: MEDICAL ACTION INDUSTRIES

## (undated) DEVICE — EXOFIN FUSION 4X22CM

## (undated) DEVICE — SOL  .9 1000ML BTL

## (undated) DEVICE — DRAPE SHEET TRANSVERSE LAP

## (undated) DEVICE — MINOR GENERAL: Brand: MEDLINE INDUSTRIES, INC.

## (undated) DEVICE — SUTURE CHROMIC 2-0 801H

## (undated) NOTE — Clinical Note
Initial assessment completed with patient.  Message sent to office to assist in scheduling.  Thank you!

## (undated) NOTE — LETTER
6/15/2023              Arvin Esparza        [address]         Dear Mary Avila,    This letter is to inform you that our office has made several attempts to reach you by phone without success. We were attempting to contact you by phone regarding medication refill and due for appointment. Please contact our office at the number listed below as soon as you receive this letter to discuss this issue and to make the necessary changes in our system to your contact information. Thank you for your cooperation.         Sincerely,    DO [address] [id number] [facility]   [phone]        Document electronically generated by:  Pop GALARZAN